# Patient Record
Sex: FEMALE | Race: WHITE | NOT HISPANIC OR LATINO | URBAN - METROPOLITAN AREA
[De-identification: names, ages, dates, MRNs, and addresses within clinical notes are randomized per-mention and may not be internally consistent; named-entity substitution may affect disease eponyms.]

---

## 2017-01-08 ENCOUNTER — EMERGENCY (EMERGENCY)
Facility: HOSPITAL | Age: 72
LOS: 1 days | Discharge: HOME | End: 2017-01-08
Attending: EMERGENCY MEDICINE | Admitting: EMERGENCY MEDICINE
Payer: MEDICARE

## 2017-01-08 VITALS
DIASTOLIC BLOOD PRESSURE: 78 MMHG | SYSTOLIC BLOOD PRESSURE: 148 MMHG | RESPIRATION RATE: 18 BRPM | WEIGHT: 134.04 LBS | OXYGEN SATURATION: 95 % | HEART RATE: 88 BPM | TEMPERATURE: 98 F

## 2017-01-08 VITALS
HEART RATE: 77 BPM | SYSTOLIC BLOOD PRESSURE: 118 MMHG | DIASTOLIC BLOOD PRESSURE: 72 MMHG | TEMPERATURE: 98 F | OXYGEN SATURATION: 98 % | RESPIRATION RATE: 18 BRPM

## 2017-01-08 DIAGNOSIS — R07.81 PLEURODYNIA: ICD-10-CM

## 2017-01-08 DIAGNOSIS — Z88.8 ALLERGY STATUS TO OTHER DRUGS, MEDICAMENTS AND BIOLOGICAL SUBSTANCES STATUS: ICD-10-CM

## 2017-01-08 LAB
ALBUMIN SERPL ELPH-MCNC: 3.5 G/DL — SIGNIFICANT CHANGE UP (ref 3.4–5)
ALP SERPL-CCNC: 122 U/L — HIGH (ref 40–120)
ALT FLD-CCNC: 53 U/L — HIGH (ref 12–42)
ANION GAP SERPL CALC-SCNC: 9 MMOL/L — SIGNIFICANT CHANGE UP (ref 9–16)
APTT BLD: 30.8 SEC — SIGNIFICANT CHANGE UP (ref 27.5–37.4)
AST SERPL-CCNC: 50 U/L — HIGH (ref 15–37)
BASOPHILS NFR BLD AUTO: 0.6 % — SIGNIFICANT CHANGE UP (ref 0–2)
BILIRUB SERPL-MCNC: 0.2 MG/DL — SIGNIFICANT CHANGE UP (ref 0.2–1.2)
BUN SERPL-MCNC: 25 MG/DL — HIGH (ref 7–23)
CALCIUM SERPL-MCNC: 8.5 MG/DL — SIGNIFICANT CHANGE UP (ref 8.5–10.5)
CHLORIDE SERPL-SCNC: 105 MMOL/L — SIGNIFICANT CHANGE UP (ref 96–108)
CK MB CFR SERPL CALC: 1.8 NG/ML — SIGNIFICANT CHANGE UP (ref 0.5–3.6)
CK SERPL-CCNC: 110 U/L — SIGNIFICANT CHANGE UP (ref 26–192)
CK SERPL-CCNC: 81 U/L — SIGNIFICANT CHANGE UP (ref 26–192)
CO2 SERPL-SCNC: 25 MMOL/L — SIGNIFICANT CHANGE UP (ref 22–31)
CREAT SERPL-MCNC: 0.79 MG/DL — SIGNIFICANT CHANGE UP (ref 0.5–1.3)
EOSINOPHIL NFR BLD AUTO: 1.9 % — SIGNIFICANT CHANGE UP (ref 0–6)
GLUCOSE SERPL-MCNC: 98 MG/DL — SIGNIFICANT CHANGE UP (ref 70–99)
HCT VFR BLD CALC: 38.5 % — SIGNIFICANT CHANGE UP (ref 34.5–45)
HGB BLD-MCNC: 12.8 G/DL — SIGNIFICANT CHANGE UP (ref 11.5–15.5)
INR BLD: 0.92 — SIGNIFICANT CHANGE UP (ref 0.88–1.16)
LYMPHOCYTES # BLD AUTO: 16.8 % — SIGNIFICANT CHANGE UP (ref 13–44)
MCHC RBC-ENTMCNC: 30.8 PG — SIGNIFICANT CHANGE UP (ref 27–34)
MCHC RBC-ENTMCNC: 33.2 G/DL — SIGNIFICANT CHANGE UP (ref 32–36)
MCV RBC AUTO: 92.5 FL — SIGNIFICANT CHANGE UP (ref 80–100)
MONOCYTES NFR BLD AUTO: 10.5 % — SIGNIFICANT CHANGE UP (ref 2–14)
NEUTROPHILS NFR BLD AUTO: 70.2 % — SIGNIFICANT CHANGE UP (ref 43–77)
PLATELET # BLD AUTO: 227 K/UL — SIGNIFICANT CHANGE UP (ref 150–400)
POTASSIUM SERPL-MCNC: 3.9 MMOL/L — SIGNIFICANT CHANGE UP (ref 3.5–5.3)
POTASSIUM SERPL-SCNC: 3.9 MMOL/L — SIGNIFICANT CHANGE UP (ref 3.5–5.3)
PROT SERPL-MCNC: 7 G/DL — SIGNIFICANT CHANGE UP (ref 6.4–8.2)
PROTHROM AB SERPL-ACNC: 10.2 SEC — SIGNIFICANT CHANGE UP (ref 10–13.1)
RBC # BLD: 4.16 M/UL — SIGNIFICANT CHANGE UP (ref 3.8–5.2)
RBC # FLD: 13 % — SIGNIFICANT CHANGE UP (ref 10.3–16.9)
SODIUM SERPL-SCNC: 139 MMOL/L — SIGNIFICANT CHANGE UP (ref 135–145)
TROPONIN I SERPL-MCNC: <0.015 NG/ML — SIGNIFICANT CHANGE UP (ref 0.01–0.04)
TROPONIN I SERPL-MCNC: <0.015 NG/ML — SIGNIFICANT CHANGE UP (ref 0.01–0.04)
WBC # BLD: 8.9 K/UL — SIGNIFICANT CHANGE UP (ref 3.8–10.5)
WBC # FLD AUTO: 8.9 K/UL — SIGNIFICANT CHANGE UP (ref 3.8–10.5)

## 2017-01-08 PROCEDURE — 85610 PROTHROMBIN TIME: CPT

## 2017-01-08 PROCEDURE — 71045 X-RAY EXAM CHEST 1 VIEW: CPT

## 2017-01-08 PROCEDURE — 82553 CREATINE MB FRACTION: CPT

## 2017-01-08 PROCEDURE — 84484 ASSAY OF TROPONIN QUANT: CPT

## 2017-01-08 PROCEDURE — 71010: CPT | Mod: 26

## 2017-01-08 PROCEDURE — 93005 ELECTROCARDIOGRAM TRACING: CPT

## 2017-01-08 PROCEDURE — 93010 ELECTROCARDIOGRAM REPORT: CPT

## 2017-01-08 PROCEDURE — 80053 COMPREHEN METABOLIC PANEL: CPT

## 2017-01-08 PROCEDURE — 85730 THROMBOPLASTIN TIME PARTIAL: CPT

## 2017-01-08 PROCEDURE — 85025 COMPLETE CBC W/AUTO DIFF WBC: CPT

## 2017-01-08 PROCEDURE — 85379 FIBRIN DEGRADATION QUANT: CPT

## 2017-01-08 PROCEDURE — 99285 EMERGENCY DEPT VISIT HI MDM: CPT | Mod: 25

## 2017-01-08 PROCEDURE — 36415 COLL VENOUS BLD VENIPUNCTURE: CPT

## 2017-01-08 PROCEDURE — 82550 ASSAY OF CK (CPK): CPT

## 2017-01-08 PROCEDURE — 99283 EMERGENCY DEPT VISIT LOW MDM: CPT | Mod: 25

## 2017-01-08 NOTE — ED PROVIDER NOTE - PROGRESS NOTE DETAILS
pt sleeping and comfortable during ED stay  neg trop x 2  case d/w dr tabor- stable for d/c  stress test in 1-2 days return precautions d/w pt including CP sob N/V dizziness - low susp for ACS at this time

## 2017-01-08 NOTE — ED PROVIDER NOTE - MEDICAL DECISION MAKING DETAILS
72 yo female with CP since 7 am today - non extertional -nl EKG -Fam hx of MI -Dad- hx of hiatal hernia - ex smoker  last stress test was nl 1 year ago await - not classic for ACS or PE -- await trop and d-dimer

## 2017-01-08 NOTE — ED PROVIDER NOTE - CARE PLAN
Principal Discharge DX:	Chest pain Principal Discharge DX:	Chest pain  Secondary Diagnosis:	Rib pain on left side

## 2017-01-08 NOTE — ED ADULT NURSE NOTE - CHPI ED SYMPTOMS NEG
no orthopnea/no loss of consciousness/no sleeping issues/no fatigue/no chills/no cough/no vomiting/no edema/no chest tightness/no crying/no back pain/no syncope/no weakness/no malaise/no jaw pain/no palpitations/no paroxysmal nocturnal dyspnea/no shortness of breath/no nervousness/no numbness/no dizziness/no fever/no diaphoresis/no left arm pain

## 2017-01-08 NOTE — CONSULT NOTE ADULT - SUBJECTIVE AND OBJECTIVE BOX
CHIEF COMPLAINT:  chest pain  HISTORY OF PRESENT ILLNESS:  The patient has been having left lateral crampy chest pain. It has lasted for hours and is a 3/10. It is constant. The pain is unrelated to breathing walking or eating. The patient reports a normal stress test a year ago. She has a family history of coronary disease in her parents when they were elderly. She is a former smoker. The patient was doing some unusual activity yesterday he required reaching up high. She has also had some nausea. Last night the patient had heavier than usual alcohol intake. The patient also reports an echocardiogram that was unremarkable 1.5 years ago.The patient is physically active without chest discomfort.  Chart reviewed  PAST MEDICAL & SURGICAL HISTORY:  No pertinent past medical history      [  ] Diabetes   [  ] Hypertension  [  ] Hyperlipidemia  [  ] CAD  [  ] PCI  [  ] CABG    PREVIOUS DIAGNOSTIC TESTING:    [ x ] Echocardiogram:  [  ]  Catheterization:  [ x ] Stress Test:  	    MEDICATIONS:Aspirin                  FAMILY HISTORY:  Coronary disease and advanced age in her parents.    SOCIAL HISTORY:    [ x ] Non-smoker  [  ] Smoker  [  ] Alcohol    Allergies    nitroglycerin (Unknown)    Intolerances    	    REVIEW OF SYSTEMS:  CONSTITUTIONAL: No fever, weight loss, or fatigue  EYES: No eye pain, visual disturbances, or discharge  ENMT:  No difficulty hearing, tinnitus, vertigo; No sinus or throat pain  NECK: No pain or stiffness  RESPIRATORY: No cough, wheezing, chills or hemoptysis; No Shortness of Breath  CARDIOVASCULAR: +chest pain,- palpitations, passing out, dizziness, or leg swelling  GASTROINTESTINAL: No abdominal or epigastric pain. No nausea, vomiting, or hematemesis; No diarrhea or constipation. No melena or hematochezia.  GENITOURINARY: No dysuria, frequency, hematuria, or incontinence  NEUROLOGICAL: No headaches, memory loss, loss of strength, numbness, or tremors  SKIN: No itching, burning, rashes, or lesions   LYMPH Nodes: No enlarged glands  ENDOCRINE: No heat or cold intolerance; No hair loss  MUSCULOSKELETAL: No joint pain or swelling; No muscle, back, or extremity pain  PSYCHIATRIC: No depression, anxiety, mood swings, or difficulty sleeping  HEME/LYMPH: No easy bruising, or bleeding gums  ALLERY AND IMMUNOLOGIC: No hives or eczema	    PHYSICAL EXAM:  T(C): 36.7, Max: 36.7 (01-08 @ 10:03)  HR: 77 (77 - 90)  BP: 118/72 (115/67 - 148/78)  RR: 18 (18 - 18)  SpO2: 98% (95% - 99%)  Wt(kg): --  I&O's Summary      Appearance: Normal	  HEENT:   Normal oral mucosa, PERRL, EOMI	  Lymphatic: No lymphadenopathy  Cardiovascular: Normal S1 S2, No JVD, No murmur, No edema. + Chest tenderness  Respiratory: Lungs clear to auscultation	  Psychiatry: A & O x 3, Mood & affect appropriate  Gastrointestinal:  Soft, Non-tender, + BS	  Skin: No rashes, No ecchymoses, No cyanosis	  Neurologic: Non-focal  Extremities: Normal range of motion, No clubbing or cyanosis  TELEMETRY: 	  Sinus rhythm  ECG:  	Normal sinus rhythm RSR prime in V1  RADIOLOGY:	Chest x-ray unremarkable  LABS:	 	    CARDIAC MARKERS:  Troponin I, Serum: <0.015 ng/mL (01-08 @ 10:36)                         12.8   8.9   )-----------( 227      ( 08 Jan 2017 10:36 )             38.5     08 Jan 2017 10:36    139    |  105    |  25     ----------------------------<  98     3.9     |  25     |  0.79     Ca    8.5        08 Jan 2017 10:36    TPro  7.0    /  Alb  3.5    /  TBili  0.2    /  DBili  x      /  AST  50     /  ALT  53     /  AlkPhos  122    08 Jan 2017 10:36  :   PT/INR - ( 08 Jan 2017 10:36 )   PT: 10.2 sec;   INR: 0.92          PTT - ( 08 Jan 2017 10:36 )  PTT:30.8 sec    ASSESSMENT/PLAN: 	  Chest pain-the pain is atypical/non-anginal. There is no history of exertional pain. The EKG is normal. The first troponin is normal. The chest x-ray is unremarkable.The chest is tender. We await repeat troponin. Continue aspirin.  Case discussed with Emergency room physician.

## 2017-01-08 NOTE — ED PROVIDER NOTE - OBJECTIVE STATEMENT
70 yo female with hx of hiatal hernia, rotator cuff tear - labral in the past  with onset of left lateral rib pain upon awakening at 7 am  -no N/V-  slight radiation to left arm  - no back pain- no diaphoresis no cough or fevers or chills- no  recent long car/plane trips Dad  fatal MI  age 59   ex smoker  quit 40 years ago  3 ppd  age 10-30  no DM or hx of HLD  last stress test was 1 year ago  -- treadmill  was reportedly neg

## 2017-01-08 NOTE — ED ADULT TRIAGE NOTE - CHIEF COMPLAINT QUOTE
patient states that at 7AM she felt pain in her breast and around the left side of her ribs and chest. she states that she  took an  baby aspirin at the time. denies fever, sob, dizziness. patient does not take daily medications and denies any medical problems

## 2017-03-17 ENCOUNTER — EMERGENCY (EMERGENCY)
Facility: HOSPITAL | Age: 72
LOS: 1 days | Discharge: HOME | End: 2017-03-17
Attending: EMERGENCY MEDICINE | Admitting: EMERGENCY MEDICINE
Payer: MEDICARE

## 2017-03-17 VITALS
RESPIRATION RATE: 18 BRPM | HEART RATE: 81 BPM | TEMPERATURE: 97 F | SYSTOLIC BLOOD PRESSURE: 124 MMHG | OXYGEN SATURATION: 100 % | DIASTOLIC BLOOD PRESSURE: 74 MMHG

## 2017-03-17 DIAGNOSIS — Z88.8 ALLERGY STATUS TO OTHER DRUGS, MEDICAMENTS AND BIOLOGICAL SUBSTANCES STATUS: ICD-10-CM

## 2017-03-17 DIAGNOSIS — H57.12 OCULAR PAIN, LEFT EYE: ICD-10-CM

## 2017-03-17 PROCEDURE — 99283 EMERGENCY DEPT VISIT LOW MDM: CPT

## 2017-03-17 RX ORDER — CIPROFLOXACIN HCL 0.3 %
1 DROPS OPHTHALMIC (EYE) ONCE
Qty: 0 | Refills: 0 | Status: COMPLETED | OUTPATIENT
Start: 2017-03-17 | End: 2017-03-17

## 2017-03-17 RX ADMIN — Medication 1 DROP(S): at 03:28

## 2017-03-17 NOTE — ED PROVIDER NOTE - OBJECTIVE STATEMENT
feels FB of her contact retained in eye as vision better OS> OD Patient has tried manually for hours to remove this FB and now eye irritated

## 2017-03-17 NOTE — ED PROVIDER NOTE - PHYSICAL EXAMINATION
OS with somewhat better vision then OD with various visual assessments for distance viewing though no FB/ contact appreciated

## 2017-03-17 NOTE — ED PROVIDER NOTE - MEDICAL DECISION MAKING DETAILS
counseled on ophthalmological follow up eye drops given suggestred decreased attempts as no providor ( PA and both Attendings fail to locate any contact in EYE )

## 2017-03-17 NOTE — ED PROVIDER NOTE - ATTENDING CONTRIBUTION TO CARE
71 yof pw FB sensation in L eye, feels like her contact is in as she sees better out of L eye (only wears L eye contact).  attempted removal at home w/o success.      agree w/ PA, mildly injected conjunctiva, full EOMI, no entrapment, no chemosis, no obvious FB noted, will give cipro ophthalmic (possible irritation from repeated removal at home), MEETH follow up

## 2018-05-02 ENCOUNTER — EMERGENCY (EMERGENCY)
Facility: HOSPITAL | Age: 73
LOS: 1 days | Discharge: HOME | End: 2018-05-02
Attending: EMERGENCY MEDICINE | Admitting: EMERGENCY MEDICINE
Payer: MEDICARE

## 2018-05-02 VITALS
RESPIRATION RATE: 16 BRPM | WEIGHT: 134.92 LBS | TEMPERATURE: 98 F | SYSTOLIC BLOOD PRESSURE: 128 MMHG | HEART RATE: 71 BPM | DIASTOLIC BLOOD PRESSURE: 76 MMHG | OXYGEN SATURATION: 98 %

## 2018-05-02 VITALS
SYSTOLIC BLOOD PRESSURE: 139 MMHG | OXYGEN SATURATION: 100 % | RESPIRATION RATE: 16 BRPM | DIASTOLIC BLOOD PRESSURE: 72 MMHG | HEART RATE: 68 BPM

## 2018-05-02 DIAGNOSIS — R07.9 CHEST PAIN, UNSPECIFIED: ICD-10-CM

## 2018-05-02 DIAGNOSIS — Z87.891 PERSONAL HISTORY OF NICOTINE DEPENDENCE: ICD-10-CM

## 2018-05-02 DIAGNOSIS — R06.02 SHORTNESS OF BREATH: ICD-10-CM

## 2018-05-02 DIAGNOSIS — Z88.8 ALLERGY STATUS TO OTHER DRUGS, MEDICAMENTS AND BIOLOGICAL SUBSTANCES STATUS: ICD-10-CM

## 2018-05-02 LAB
ANION GAP SERPL CALC-SCNC: 10 MMOL/L — SIGNIFICANT CHANGE UP (ref 5–17)
APTT BLD: 28.6 SEC — SIGNIFICANT CHANGE UP (ref 27.5–37.4)
BASOPHILS NFR BLD AUTO: 0.5 % — SIGNIFICANT CHANGE UP (ref 0–2)
BUN SERPL-MCNC: 16 MG/DL — SIGNIFICANT CHANGE UP (ref 7–23)
CALCIUM SERPL-MCNC: 9.5 MG/DL — SIGNIFICANT CHANGE UP (ref 8.4–10.5)
CHLORIDE SERPL-SCNC: 100 MMOL/L — SIGNIFICANT CHANGE UP (ref 96–108)
CK MB CFR SERPL CALC: 4.1 NG/ML — SIGNIFICANT CHANGE UP (ref 0–6.7)
CK SERPL-CCNC: 191 U/L — HIGH (ref 25–170)
CO2 SERPL-SCNC: 28 MMOL/L — SIGNIFICANT CHANGE UP (ref 22–31)
CREAT SERPL-MCNC: 0.78 MG/DL — SIGNIFICANT CHANGE UP (ref 0.5–1.3)
D DIMER BLD IA.RAPID-MCNC: <150 NG/ML DDU — SIGNIFICANT CHANGE UP
EOSINOPHIL NFR BLD AUTO: 1.1 % — SIGNIFICANT CHANGE UP (ref 0–6)
GLUCOSE SERPL-MCNC: 140 MG/DL — HIGH (ref 70–99)
HCT VFR BLD CALC: 39.2 % — SIGNIFICANT CHANGE UP (ref 34.5–45)
HGB BLD-MCNC: 12.8 G/DL — SIGNIFICANT CHANGE UP (ref 11.5–15.5)
INR BLD: 0.96 — SIGNIFICANT CHANGE UP (ref 0.88–1.16)
LYMPHOCYTES # BLD AUTO: 20 % — SIGNIFICANT CHANGE UP (ref 13–44)
MCHC RBC-ENTMCNC: 30 PG — SIGNIFICANT CHANGE UP (ref 27–34)
MCHC RBC-ENTMCNC: 32.7 G/DL — SIGNIFICANT CHANGE UP (ref 32–36)
MCV RBC AUTO: 92 FL — SIGNIFICANT CHANGE UP (ref 80–100)
MONOCYTES NFR BLD AUTO: 7.7 % — SIGNIFICANT CHANGE UP (ref 2–14)
NEUTROPHILS NFR BLD AUTO: 70.7 % — SIGNIFICANT CHANGE UP (ref 43–77)
PLATELET # BLD AUTO: 271 K/UL — SIGNIFICANT CHANGE UP (ref 150–400)
POTASSIUM SERPL-MCNC: 3.3 MMOL/L — LOW (ref 3.5–5.3)
POTASSIUM SERPL-SCNC: 3.3 MMOL/L — LOW (ref 3.5–5.3)
PROTHROM AB SERPL-ACNC: 10.6 SEC — SIGNIFICANT CHANGE UP (ref 9.8–12.7)
RBC # BLD: 4.26 M/UL — SIGNIFICANT CHANGE UP (ref 3.8–5.2)
RBC # FLD: 12.9 % — SIGNIFICANT CHANGE UP (ref 10.3–16.9)
SODIUM SERPL-SCNC: 138 MMOL/L — SIGNIFICANT CHANGE UP (ref 135–145)
TROPONIN T SERPL-MCNC: <0.01 NG/ML — SIGNIFICANT CHANGE UP (ref 0–0.01)
WBC # BLD: 9.6 K/UL — SIGNIFICANT CHANGE UP (ref 3.8–10.5)
WBC # FLD AUTO: 9.6 K/UL — SIGNIFICANT CHANGE UP (ref 3.8–10.5)

## 2018-05-02 PROCEDURE — 85379 FIBRIN DEGRADATION QUANT: CPT

## 2018-05-02 PROCEDURE — 36415 COLL VENOUS BLD VENIPUNCTURE: CPT

## 2018-05-02 PROCEDURE — 85610 PROTHROMBIN TIME: CPT

## 2018-05-02 PROCEDURE — 80048 BASIC METABOLIC PNL TOTAL CA: CPT

## 2018-05-02 PROCEDURE — 85025 COMPLETE CBC W/AUTO DIFF WBC: CPT

## 2018-05-02 PROCEDURE — 99285 EMERGENCY DEPT VISIT HI MDM: CPT | Mod: 25

## 2018-05-02 PROCEDURE — 93005 ELECTROCARDIOGRAM TRACING: CPT

## 2018-05-02 PROCEDURE — 82553 CREATINE MB FRACTION: CPT

## 2018-05-02 PROCEDURE — 82550 ASSAY OF CK (CPK): CPT

## 2018-05-02 PROCEDURE — 84484 ASSAY OF TROPONIN QUANT: CPT

## 2018-05-02 PROCEDURE — 93010 ELECTROCARDIOGRAM REPORT: CPT

## 2018-05-02 PROCEDURE — 71046 X-RAY EXAM CHEST 2 VIEWS: CPT

## 2018-05-02 PROCEDURE — 99284 EMERGENCY DEPT VISIT MOD MDM: CPT | Mod: 25

## 2018-05-02 PROCEDURE — 71046 X-RAY EXAM CHEST 2 VIEWS: CPT | Mod: 26

## 2018-05-02 PROCEDURE — 85730 THROMBOPLASTIN TIME PARTIAL: CPT

## 2018-05-02 RX ORDER — ASPIRIN/CALCIUM CARB/MAGNESIUM 324 MG
325 TABLET ORAL ONCE
Qty: 0 | Refills: 0 | Status: COMPLETED | OUTPATIENT
Start: 2018-05-02 | End: 2018-05-02

## 2018-05-02 RX ADMIN — Medication 325 MILLIGRAM(S): at 19:14

## 2018-05-02 NOTE — ED PROVIDER NOTE - PROGRESS NOTE DETAILS
Dr Crenshaw in ed and eval pt - agreed w plan for enzymes, ddimer.  If neg eval, pt to fu as outpt for stress and echo in the office. Repeat ekg similar to 1st.  EKG's reviewed w Dr Crenshaw - she feels ekg's ok and pt can fu as planned as outpt.

## 2018-05-02 NOTE — ED PROVIDER NOTE - MEDICAL DECISION MAKING DETAILS
Pt c/o cp/sob since moving furniture at 230p today w ?ble ekg changes but no stemi criteria.  Pt w/o h/o cad.  ? acs.  No pe risks, pna/uri sx - low suspicion for these.  Plan 1 set enzymes since pain > 4h, cxr, labs, reassess.  Pt given asa.

## 2018-05-02 NOTE — ED ADULT NURSE NOTE - CHIEF COMPLAINT QUOTE
73 y/o female c/o SOB, feeling nauseous and chest discomfort for the past few days. pt states " I feel like my heart is working more than usual", Pt denies cp, dizziness, vomiting

## 2018-05-02 NOTE — ED ADULT TRIAGE NOTE - CHIEF COMPLAINT QUOTE
71 y/o female c/o SOB, feeling nauseous and chest discomfort for the past few days. pt states " I feel like my heart is working more than usual", Pt denies cp, dizziness, vomiting

## 2018-05-02 NOTE — ED PROVIDER NOTE - OBJECTIVE STATEMENT
73 yo female no pmh c/o feeling her heart beat harder than typical w/o irregular heart beat sensation and mild sob since moving furniture today at 230.  No radiation, n, dizziness, diaphoresis.  Pt's partner noted recent pursed lip breathing w/ exertion w/o pt noting cp/wetzel.  No h/o cad.  Prior stress test ~ 2 yr ago nl.  No le pain/swelling, recent immobilization, h/o pe/dvt, uri sx, cough, fever.  + fh cad (father mi 60's).  No h/o similar sx.

## 2018-05-10 ENCOUNTER — OUTPATIENT (OUTPATIENT)
Dept: OUTPATIENT SERVICES | Facility: HOSPITAL | Age: 73
LOS: 1 days | End: 2018-05-10
Payer: MEDICARE

## 2018-05-10 DIAGNOSIS — R06.00 DYSPNEA, UNSPECIFIED: ICD-10-CM

## 2018-05-10 PROCEDURE — 93016 CV STRESS TEST SUPVJ ONLY: CPT

## 2018-05-10 PROCEDURE — 78452 HT MUSCLE IMAGE SPECT MULT: CPT | Mod: 26

## 2018-05-10 PROCEDURE — A9505: CPT

## 2018-05-10 PROCEDURE — 93306 TTE W/DOPPLER COMPLETE: CPT | Mod: 26

## 2018-05-10 PROCEDURE — 93306 TTE W/DOPPLER COMPLETE: CPT

## 2018-05-10 PROCEDURE — 78452 HT MUSCLE IMAGE SPECT MULT: CPT

## 2018-05-10 PROCEDURE — A9500: CPT

## 2018-05-10 PROCEDURE — 93018 CV STRESS TEST I&R ONLY: CPT

## 2018-05-10 PROCEDURE — 93017 CV STRESS TEST TRACING ONLY: CPT

## 2018-06-21 ENCOUNTER — EMERGENCY (EMERGENCY)
Facility: HOSPITAL | Age: 73
LOS: 1 days | Discharge: HOME | End: 2018-06-21
Attending: EMERGENCY MEDICINE | Admitting: EMERGENCY MEDICINE
Payer: MEDICARE

## 2018-06-21 VITALS
DIASTOLIC BLOOD PRESSURE: 60 MMHG | HEART RATE: 98 BPM | OXYGEN SATURATION: 95 % | RESPIRATION RATE: 18 BRPM | SYSTOLIC BLOOD PRESSURE: 106 MMHG | TEMPERATURE: 99 F

## 2018-06-21 VITALS
HEART RATE: 104 BPM | SYSTOLIC BLOOD PRESSURE: 149 MMHG | OXYGEN SATURATION: 97 % | WEIGHT: 126.1 LBS | RESPIRATION RATE: 18 BRPM | DIASTOLIC BLOOD PRESSURE: 75 MMHG | TEMPERATURE: 103 F

## 2018-06-21 LAB
ALBUMIN SERPL ELPH-MCNC: 4.2 G/DL — SIGNIFICANT CHANGE UP (ref 3.3–5)
ALP SERPL-CCNC: 175 U/L — HIGH (ref 40–120)
ALT FLD-CCNC: 59 U/L — HIGH (ref 10–45)
ANION GAP SERPL CALC-SCNC: 16 MMOL/L — SIGNIFICANT CHANGE UP (ref 5–17)
APPEARANCE UR: CLEAR — SIGNIFICANT CHANGE UP
APTT BLD: 30.2 SEC — SIGNIFICANT CHANGE UP (ref 27.5–37.4)
AST SERPL-CCNC: 54 U/L — HIGH (ref 10–40)
BASOPHILS NFR BLD AUTO: 0.2 % — SIGNIFICANT CHANGE UP (ref 0–2)
BILIRUB SERPL-MCNC: 1.2 MG/DL — SIGNIFICANT CHANGE UP (ref 0.2–1.2)
BILIRUB UR-MCNC: ABNORMAL
BUN SERPL-MCNC: 10 MG/DL — SIGNIFICANT CHANGE UP (ref 7–23)
CALCIUM SERPL-MCNC: 9.4 MG/DL — SIGNIFICANT CHANGE UP (ref 8.4–10.5)
CHLORIDE SERPL-SCNC: 96 MMOL/L — SIGNIFICANT CHANGE UP (ref 96–108)
CO2 SERPL-SCNC: 26 MMOL/L — SIGNIFICANT CHANGE UP (ref 22–31)
COLOR SPEC: YELLOW — SIGNIFICANT CHANGE UP
CREAT SERPL-MCNC: 0.74 MG/DL — SIGNIFICANT CHANGE UP (ref 0.5–1.3)
DIFF PNL FLD: ABNORMAL
EOSINOPHIL NFR BLD AUTO: 0.1 % — SIGNIFICANT CHANGE UP (ref 0–6)
GLUCOSE SERPL-MCNC: 134 MG/DL — HIGH (ref 70–99)
GLUCOSE UR QL: NEGATIVE — SIGNIFICANT CHANGE UP
HCT VFR BLD CALC: 40.1 % — SIGNIFICANT CHANGE UP (ref 34.5–45)
HGB BLD-MCNC: 12.9 G/DL — SIGNIFICANT CHANGE UP (ref 11.5–15.5)
INR BLD: 1.14 — SIGNIFICANT CHANGE UP (ref 0.88–1.16)
KETONES UR-MCNC: 15 MG/DL
LACTATE SERPL-SCNC: 1.5 MMOL/L — SIGNIFICANT CHANGE UP (ref 0.5–2)
LEUKOCYTE ESTERASE UR-ACNC: ABNORMAL
LIDOCAIN IGE QN: 18 U/L — SIGNIFICANT CHANGE UP (ref 7–60)
LYMPHOCYTES # BLD AUTO: 4.3 % — LOW (ref 13–44)
MCHC RBC-ENTMCNC: 29.6 PG — SIGNIFICANT CHANGE UP (ref 27–34)
MCHC RBC-ENTMCNC: 32.2 G/DL — SIGNIFICANT CHANGE UP (ref 32–36)
MCV RBC AUTO: 92 FL — SIGNIFICANT CHANGE UP (ref 80–100)
MONOCYTES NFR BLD AUTO: 10.8 % — SIGNIFICANT CHANGE UP (ref 2–14)
NEUTROPHILS NFR BLD AUTO: 84.6 % — HIGH (ref 43–77)
NITRITE UR-MCNC: NEGATIVE — SIGNIFICANT CHANGE UP
PH UR: 5.5 — SIGNIFICANT CHANGE UP (ref 5–8)
PLATELET # BLD AUTO: 264 K/UL — SIGNIFICANT CHANGE UP (ref 150–400)
POTASSIUM SERPL-MCNC: 3.6 MMOL/L — SIGNIFICANT CHANGE UP (ref 3.5–5.3)
POTASSIUM SERPL-SCNC: 3.6 MMOL/L — SIGNIFICANT CHANGE UP (ref 3.5–5.3)
PROT SERPL-MCNC: 8.1 G/DL — SIGNIFICANT CHANGE UP (ref 6–8.3)
PROT UR-MCNC: 30 MG/DL
PROTHROM AB SERPL-ACNC: 12.7 SEC — SIGNIFICANT CHANGE UP (ref 9.8–12.7)
RAPID RVP RESULT: SIGNIFICANT CHANGE UP
RBC # BLD: 4.36 M/UL — SIGNIFICANT CHANGE UP (ref 3.8–5.2)
RBC # FLD: 12.6 % — SIGNIFICANT CHANGE UP (ref 10.3–16.9)
SODIUM SERPL-SCNC: 138 MMOL/L — SIGNIFICANT CHANGE UP (ref 135–145)
SP GR SPEC: 1.02 — SIGNIFICANT CHANGE UP (ref 1–1.03)
UROBILINOGEN FLD QL: 1 E.U./DL — SIGNIFICANT CHANGE UP
WBC # BLD: 15.5 K/UL — HIGH (ref 3.8–10.5)
WBC # FLD AUTO: 15.5 K/UL — HIGH (ref 3.8–10.5)

## 2018-06-21 PROCEDURE — 84484 ASSAY OF TROPONIN QUANT: CPT

## 2018-06-21 PROCEDURE — 36415 COLL VENOUS BLD VENIPUNCTURE: CPT

## 2018-06-21 PROCEDURE — 82553 CREATINE MB FRACTION: CPT

## 2018-06-21 PROCEDURE — 99284 EMERGENCY DEPT VISIT MOD MDM: CPT | Mod: 25

## 2018-06-21 PROCEDURE — 85730 THROMBOPLASTIN TIME PARTIAL: CPT

## 2018-06-21 PROCEDURE — 96375 TX/PRO/DX INJ NEW DRUG ADDON: CPT

## 2018-06-21 PROCEDURE — 87040 BLOOD CULTURE FOR BACTERIA: CPT

## 2018-06-21 PROCEDURE — 87633 RESP VIRUS 12-25 TARGETS: CPT

## 2018-06-21 PROCEDURE — 94640 AIRWAY INHALATION TREATMENT: CPT

## 2018-06-21 PROCEDURE — 83880 ASSAY OF NATRIURETIC PEPTIDE: CPT

## 2018-06-21 PROCEDURE — 93005 ELECTROCARDIOGRAM TRACING: CPT

## 2018-06-21 PROCEDURE — 83690 ASSAY OF LIPASE: CPT

## 2018-06-21 PROCEDURE — 93010 ELECTROCARDIOGRAM REPORT: CPT

## 2018-06-21 PROCEDURE — 87486 CHLMYD PNEUM DNA AMP PROBE: CPT

## 2018-06-21 PROCEDURE — 87798 DETECT AGENT NOS DNA AMP: CPT

## 2018-06-21 PROCEDURE — 87086 URINE CULTURE/COLONY COUNT: CPT

## 2018-06-21 PROCEDURE — 80053 COMPREHEN METABOLIC PANEL: CPT

## 2018-06-21 PROCEDURE — 96374 THER/PROPH/DIAG INJ IV PUSH: CPT

## 2018-06-21 PROCEDURE — 85025 COMPLETE CBC W/AUTO DIFF WBC: CPT

## 2018-06-21 PROCEDURE — 71045 X-RAY EXAM CHEST 1 VIEW: CPT

## 2018-06-21 PROCEDURE — 82550 ASSAY OF CK (CPK): CPT

## 2018-06-21 PROCEDURE — 81001 URINALYSIS AUTO W/SCOPE: CPT

## 2018-06-21 PROCEDURE — 71045 X-RAY EXAM CHEST 1 VIEW: CPT | Mod: 26

## 2018-06-21 PROCEDURE — 83605 ASSAY OF LACTIC ACID: CPT

## 2018-06-21 PROCEDURE — 85610 PROTHROMBIN TIME: CPT

## 2018-06-21 PROCEDURE — 87581 M.PNEUMON DNA AMP PROBE: CPT

## 2018-06-21 RX ORDER — IPRATROPIUM/ALBUTEROL SULFATE 18-103MCG
3 AEROSOL WITH ADAPTER (GRAM) INHALATION ONCE
Qty: 0 | Refills: 0 | Status: COMPLETED | OUTPATIENT
Start: 2018-06-21 | End: 2018-06-21

## 2018-06-21 RX ORDER — KETOROLAC TROMETHAMINE 30 MG/ML
30 SYRINGE (ML) INJECTION ONCE
Qty: 0 | Refills: 0 | Status: DISCONTINUED | OUTPATIENT
Start: 2018-06-21 | End: 2018-06-21

## 2018-06-21 RX ORDER — SODIUM CHLORIDE 9 MG/ML
2000 INJECTION INTRAMUSCULAR; INTRAVENOUS; SUBCUTANEOUS ONCE
Qty: 0 | Refills: 0 | Status: COMPLETED | OUTPATIENT
Start: 2018-06-21 | End: 2018-06-21

## 2018-06-21 RX ORDER — CEFTRIAXONE 500 MG/1
1 INJECTION, POWDER, FOR SOLUTION INTRAMUSCULAR; INTRAVENOUS ONCE
Qty: 0 | Refills: 0 | Status: COMPLETED | OUTPATIENT
Start: 2018-06-21 | End: 2018-06-21

## 2018-06-21 RX ORDER — ACETAMINOPHEN 500 MG
975 TABLET ORAL ONCE
Qty: 0 | Refills: 0 | Status: COMPLETED | OUTPATIENT
Start: 2018-06-21 | End: 2018-06-21

## 2018-06-21 RX ADMIN — SODIUM CHLORIDE 4000 MILLILITER(S): 9 INJECTION INTRAMUSCULAR; INTRAVENOUS; SUBCUTANEOUS at 16:41

## 2018-06-21 RX ADMIN — Medication 975 MILLIGRAM(S): at 16:40

## 2018-06-21 RX ADMIN — CEFTRIAXONE 100 GRAM(S): 500 INJECTION, POWDER, FOR SOLUTION INTRAMUSCULAR; INTRAVENOUS at 16:40

## 2018-06-21 RX ADMIN — Medication 30 MILLIGRAM(S): at 18:39

## 2018-06-21 RX ADMIN — Medication 3 MILLILITER(S): at 18:42

## 2018-06-21 NOTE — ED PROVIDER NOTE - MEDICAL DECISION MAKING DETAILS
72F with no sig PMHx fever cough and malaise x 4 days. Pt febrile and tachy on arrival so worked up per sepsis protocol. wt based IVfs and IV ceftriaxone given (no azithro given hx of biaxin allergy). Dispo pending work up. Will d/w Dr. Crenshaw as well

## 2018-06-21 NOTE — ED PROVIDER NOTE - OBJECTIVE STATEMENT
72F with h/o MVP who p/w chills, productive cough, nausea, mild headache, and general malaise x 4 days, seen by her PMD Dr. Crenshaw yesterday and told she likely had a viral syndrome, but sx worse today and +fever so came to the ER. No CP/SOb, no dizziness or syncope, no leg swelling. Takes no meds.

## 2018-06-21 NOTE — ED ADULT TRIAGE NOTE - CHIEF COMPLAINT QUOTE
HA for 4 days - sometimes nausea- went to MD yesterday and told "viral" illness-- has had a cold and mild cough Over 2 weeks

## 2018-06-21 NOTE — ED ADULT NURSE REASSESSMENT NOTE - NS ED NURSE REASSESS COMMENT FT1
chest xray being performed at the bedside as per order. pt in NAD; pt and family updated on plan of care. safety and fall precautions maintained. family at the bedside. call bell within reach.

## 2018-06-21 NOTE — ED PROVIDER NOTE - PHYSICAL EXAMINATION
GEN: Well appearing, well nourished, awake, alert, oriented to person, place, time/situation and in no apparent distress.  ENT: Airway patent, Nasal mucosa clear. Mouth with normal mucosa. No pharyngeal swelling or erythema, no sinus TTP, no facial erythema.   EYES: Clear bilaterally. perrl, eomi  RESPIRATORY: Breathing comfortably with normal RR. No w/c/r.   CARDIAC: Regular rate and rhythm, borderline tachycardia, no m/r/g  ABDOMEN: Soft, nontender, +bowel sounds, no rebound, rigidity, or guarding.  MSK: Range of motion is not limited, no deformities noted. No meningismus.  NEURO: Alert and oriented x 3. Cn 2-12 intact. Strength 5/5 and sensation intact in all 4 extremities. Gait normal.   SKIN: Skin normal color for race, warm, dry and intact. No evidence of rash.  PSYCH: Alert and oriented to person, place, time/situation. normal mood and affect. no apparent risk to self or others. GEN: Well appearing, well nourished, awake, alert, oriented to person, place, time/situation and in no apparent distress.  ENT: Airway patent, Nasal mucosa clear. Mouth with normal mucosa. No pharyngeal swelling or erythema, no sinus TTP, no facial erythema. No meningismus.  EYES: Clear bilaterally. perrl, eomi  RESPIRATORY: Breathing comfortably with normal RR. No w/c/r.   CARDIAC: Regular rate and rhythm, borderline tachycardia, no m/r/g  ABDOMEN: Soft, nontender, +bowel sounds, no rebound, rigidity, or guarding.  MSK: Range of motion is not limited, no deformities noted. No meningismus.  NEURO: Alert and oriented x 3. Cn 2-12 intact. Strength 5/5 and sensation intact in all 4 extremities. Gait normal.   SKIN: Skin normal color for race, warm, dry and intact. No evidence of rash.  PSYCH: Alert and oriented to person, place, time/situation. normal mood and affect. no apparent risk to self or others.

## 2018-06-21 NOTE — ED ADULT NURSE NOTE - OBJECTIVE STATEMENT
c/o body aches, feeling "dehydrated" and fever.  Pt reports productive cough; does not know what color the sputum is; denies chest pain, SOB and pain. unlabored and even respirations.

## 2018-06-21 NOTE — ED PROVIDER NOTE - PROGRESS NOTE DETAILS
D/w Dr. Crenshaw. CXR with RLL PNA. I also d/w pt the possibility of a UTi as UA with +LE. A UCx was sent. Pt feels better and will be DC'd with Rx for levaquin.   The patient is stable for DC. They were advised to call their PMD for prompt outpatient follow up. Return precautions were discussed. The patient was advised to return to the ER for any concerning or worsening symptoms.

## 2018-06-22 LAB
CULTURE RESULTS: NO GROWTH — SIGNIFICANT CHANGE UP
SPECIMEN SOURCE: SIGNIFICANT CHANGE UP

## 2018-06-25 DIAGNOSIS — Z88.8 ALLERGY STATUS TO OTHER DRUGS, MEDICAMENTS AND BIOLOGICAL SUBSTANCES: ICD-10-CM

## 2018-06-25 DIAGNOSIS — R50.9 FEVER, UNSPECIFIED: ICD-10-CM

## 2018-06-25 DIAGNOSIS — Z88.1 ALLERGY STATUS TO OTHER ANTIBIOTIC AGENTS STATUS: ICD-10-CM

## 2018-06-25 DIAGNOSIS — J18.9 PNEUMONIA, UNSPECIFIED ORGANISM: ICD-10-CM

## 2018-06-26 LAB
CULTURE RESULTS: SIGNIFICANT CHANGE UP
CULTURE RESULTS: SIGNIFICANT CHANGE UP
SPECIMEN SOURCE: SIGNIFICANT CHANGE UP
SPECIMEN SOURCE: SIGNIFICANT CHANGE UP

## 2018-07-07 NOTE — ED ADULT TRIAGE NOTE - NS ED NURSE BANDS TYPE
Name band;
radiology results/need for outpatient follow-up/lab results/return to ED if symptoms worsen, persist or questions arise

## 2018-11-21 NOTE — ED PROVIDER NOTE - NS ED MD DISPO DISCHARGE CCDA
Unsuccessful NG insertion in either nares. Patient/Caregiver provided printed discharge information.

## 2018-12-09 NOTE — ED ADULT NURSE NOTE - OBJECTIVE STATEMENT
Nursing Note by Penny Spence CMA at 03/15/17 03:45 PM     Author:  Penny Spence CMA Service:  (none) Author Type:  Registered Nurse     Filed:  03/15/17 03:47 PM Encounter Date:  3/15/2017 Status:  Signed     :  Penny Spence CMA (Registered Nurse)            Last treatment reviewed with patient.   Side effects ?[SC1.1T] no[SC1.1M]   Side effects include[SC1.1T] none[SC1.1M].   Patient is feeling well today ?[SC1.1T] yes[SC1.1M]  Photosensitive medication list reviewed with the patient. Has the patient started on any photosensitive medication since last treatment ?[SC1.1T] no[SC1.1M]  If yes, what is the medication?[SC1.1T] none[SC1.1M]    Treatment administered per protocol and patient tolerated the treatment without incident.  Electronically Signed by:    Penny Spence CMA , 3/15/2017[SC1.1T]        Revision History        User Key Date/Time User Provider Type Action    > SC1.1 03/15/17 03:47 PM Penny Spence CMA Registered Nurse Sign    M - Manual, T - Template             Pt presents to ER with 3/10 left sided breast pain radiating around the left side of her chest that began this morning approx. 7am with nausea. Pt stated she ate Chinese food last night for the first time in approx. 25 years. Pt stated she took an  baby aspirin. Pt denies SOB, dizziness, abd pain, diarrhea, HA, fever, chills, visual changes, no other complaints. Lung sounds clear to auscultation bilat. Last BM was 3x this morning described as normal - Pt stated she does not have an ileocecal valve. Denies urinary S/S. Pt placed on cardiac monitor, will maintain safety. Pt presents to ER with 3/10 left sided breast pain radiating around the left side of her chest that began this morning approx. 7am with nausea. Pt stated she ate Chinese food last night for the first time in approx. 25 years. Pt stated she took an  baby aspirin. Pt denies SOB, dizziness, abd pain, diarrhea, HA, fever, chills, cold, cough, visual changes, no other complaints. Lung sounds clear to auscultation bilat. Last BM was 3x this morning described as normal - Pt stated she does not have an ileocecal valve. Denies urinary S/S. Pt reports sleeping on her left side last night and has a tear in her left labrum. Pt placed on cardiac monitor, will maintain safety.

## 2019-04-20 ENCOUNTER — EMERGENCY (EMERGENCY)
Facility: HOSPITAL | Age: 74
LOS: 1 days | Discharge: HOME | End: 2019-04-20
Attending: EMERGENCY MEDICINE | Admitting: EMERGENCY MEDICINE
Payer: MEDICARE

## 2019-04-20 VITALS
HEART RATE: 72 BPM | DIASTOLIC BLOOD PRESSURE: 69 MMHG | SYSTOLIC BLOOD PRESSURE: 108 MMHG | TEMPERATURE: 98 F | OXYGEN SATURATION: 97 % | RESPIRATION RATE: 16 BRPM

## 2019-04-20 VITALS
SYSTOLIC BLOOD PRESSURE: 114 MMHG | DIASTOLIC BLOOD PRESSURE: 73 MMHG | HEIGHT: 66 IN | WEIGHT: 125 LBS | RESPIRATION RATE: 17 BRPM | HEART RATE: 91 BPM | OXYGEN SATURATION: 96 % | TEMPERATURE: 99 F

## 2019-04-20 PROCEDURE — 85025 COMPLETE CBC W/AUTO DIFF WBC: CPT

## 2019-04-20 PROCEDURE — 72125 CT NECK SPINE W/O DYE: CPT | Mod: 26

## 2019-04-20 PROCEDURE — 70450 CT HEAD/BRAIN W/O DYE: CPT

## 2019-04-20 PROCEDURE — 36415 COLL VENOUS BLD VENIPUNCTURE: CPT

## 2019-04-20 PROCEDURE — 90715 TDAP VACCINE 7 YRS/> IM: CPT

## 2019-04-20 PROCEDURE — 99284 EMERGENCY DEPT VISIT MOD MDM: CPT | Mod: 25

## 2019-04-20 PROCEDURE — 72125 CT NECK SPINE W/O DYE: CPT

## 2019-04-20 PROCEDURE — 82553 CREATINE MB FRACTION: CPT

## 2019-04-20 PROCEDURE — 85730 THROMBOPLASTIN TIME PARTIAL: CPT

## 2019-04-20 PROCEDURE — 85610 PROTHROMBIN TIME: CPT

## 2019-04-20 PROCEDURE — 90471 IMMUNIZATION ADMIN: CPT

## 2019-04-20 PROCEDURE — 82550 ASSAY OF CK (CPK): CPT

## 2019-04-20 PROCEDURE — 80053 COMPREHEN METABOLIC PANEL: CPT

## 2019-04-20 PROCEDURE — 84484 ASSAY OF TROPONIN QUANT: CPT

## 2019-04-20 PROCEDURE — 12013 RPR F/E/E/N/L/M 2.6-5.0 CM: CPT

## 2019-04-20 PROCEDURE — 70450 CT HEAD/BRAIN W/O DYE: CPT | Mod: 26

## 2019-04-20 RX ORDER — ACETAMINOPHEN 500 MG
650 TABLET ORAL ONCE
Qty: 0 | Refills: 0 | Status: COMPLETED | OUTPATIENT
Start: 2019-04-20 | End: 2019-04-20

## 2019-04-20 RX ORDER — SODIUM CHLORIDE 9 MG/ML
1000 INJECTION INTRAMUSCULAR; INTRAVENOUS; SUBCUTANEOUS ONCE
Qty: 0 | Refills: 0 | Status: COMPLETED | OUTPATIENT
Start: 2019-04-20 | End: 2019-04-20

## 2019-04-20 RX ORDER — LIDOCAINE HYDROCHLORIDE AND EPINEPHRINE 10; 10 MG/ML; UG/ML
10 INJECTION, SOLUTION INFILTRATION; PERINEURAL ONCE
Qty: 0 | Refills: 0 | Status: COMPLETED | OUTPATIENT
Start: 2019-04-20 | End: 2019-04-20

## 2019-04-20 RX ORDER — BACITRACIN ZINC 500 UNIT/G
1 OINTMENT IN PACKET (EA) TOPICAL ONCE
Qty: 0 | Refills: 0 | Status: COMPLETED | OUTPATIENT
Start: 2019-04-20 | End: 2019-04-20

## 2019-04-20 RX ORDER — TETANUS TOXOID, REDUCED DIPHTHERIA TOXOID AND ACELLULAR PERTUSSIS VACCINE, ADSORBED 5; 2.5; 8; 8; 2.5 [IU]/.5ML; [IU]/.5ML; UG/.5ML; UG/.5ML; UG/.5ML
0.5 SUSPENSION INTRAMUSCULAR ONCE
Qty: 0 | Refills: 0 | Status: COMPLETED | OUTPATIENT
Start: 2019-04-20 | End: 2019-04-20

## 2019-04-20 RX ADMIN — Medication 650 MILLIGRAM(S): at 18:20

## 2019-04-20 RX ADMIN — TETANUS TOXOID, REDUCED DIPHTHERIA TOXOID AND ACELLULAR PERTUSSIS VACCINE, ADSORBED 0.5 MILLILITER(S): 5; 2.5; 8; 8; 2.5 SUSPENSION INTRAMUSCULAR at 19:38

## 2019-04-20 RX ADMIN — SODIUM CHLORIDE 2000 MILLILITER(S): 9 INJECTION INTRAMUSCULAR; INTRAVENOUS; SUBCUTANEOUS at 17:51

## 2019-04-20 RX ADMIN — LIDOCAINE HYDROCHLORIDE AND EPINEPHRINE 10 MILLILITER(S): 10; 10 INJECTION, SOLUTION INFILTRATION; PERINEURAL at 19:50

## 2019-04-20 RX ADMIN — Medication 1 APPLICATION(S): at 20:54

## 2019-04-20 RX ADMIN — Medication 650 MILLIGRAM(S): at 17:51

## 2019-04-20 NOTE — ED ADULT TRIAGE NOTE - CHIEF COMPLAINT QUOTE
"I just had a stomach virus and I was in the bathroom and I found myself on the floor in a pool of blood".

## 2019-04-20 NOTE — ED ADULT NURSE NOTE - NSSUHOSCREENINGYN_ED_ALL_ED
Patient is a 89y old  Female who presents with a chief complaint of UTI and face swelling (01 Dec 2017 07:19)      SUBJECTIVE / OVERNIGHT EVENTS: No overnight events. Pt non-verbal unable to obtain ROS  Daughter and aide at bedside- mental status at baseline. pt nonverbal with eyes closed mostly and that is her baseline A&Ox0. Family continues to perform cheek massage. Family report no BM for several days. Family anxious re: speech and swallow eval because they would like to feed patient, CN.    MEDICATIONS  (STANDING): reviewed  dextrose 5%. 1000 milliLiter(s) (100 mL/Hr) IV Continuous <Continuous>  dextrose 5%. 1000 milliLiter(s) (50 mL/Hr) IV Continuous <Continuous>  dextrose 50% Injectable 12.5 Gram(s) IV Push once  dextrose 50% Injectable 12.5 Gram(s) IV Push once  dextrose 50% Injectable 25 Gram(s) IV Push once  dextrose 50% Injectable 25 Gram(s) IV Push once  docusate sodium 100 milliGRAM(s) Oral three times a day  heparin  Injectable 5000 Unit(s) SubCutaneous every 8 hours  influenza   Vaccine 0.5 milliLiter(s) IntraMuscular once  insulin glargine Injectable (LANTUS) 5 Unit(s) SubCutaneous at bedtime  insulin lispro (HumaLOG) corrective regimen sliding scale   SubCutaneous three times a day before meals  levothyroxine Injectable 37 MICROGram(s) IV Push daily  senna 2 Tablet(s) Oral at bedtime  sodium bicarbonate 650 milliGRAM(s) Oral two times a day  vancomycin  IVPB 750 milliGRAM(s) IV Intermittent every 48 hours    MEDICATIONS  (PRN):  acetaminophen   Tablet. 325 milliGRAM(s) Oral every 6 hours PRN Mild Pain (1 - 3)  acetaminophen   Tablet. 650 milliGRAM(s) Oral every 6 hours PRN Moderate Pain (4 - 6)  bisacodyl Suppository 10 milliGRAM(s) Rectal daily PRN Constipation  dextrose Gel 1 Dose(s) Oral once PRN Blood Glucose LESS THAN 70 milliGRAM(s)/deciliter  glucagon  Injectable 1 milliGRAM(s) IntraMuscular once PRN Glucose LESS THAN 70 milligrams/deciliter  levalbuterol Inhalation 1.25 milliGRAM(s) Inhalation two times a day PRN shortness of breath and/or wheezing  oxyCODONE    5 mG/acetaminophen 325 mG 1 Tablet(s) Oral every 6 hours PRN Severe Pain (7 - 10)  polyethylene glycol 3350 17 Gram(s) Oral two times a day PRN Constipation        CAPILLARY BLOOD GLUCOSE      POCT Blood Glucose.: 240 mg/dL (04 Dec 2017 12:29)  POCT Blood Glucose.: 248 mg/dL (03 Dec 2017 22:09)  POCT Blood Glucose.: 293 mg/dL (03 Dec 2017 17:00)    I&O's Summary    Vital Signs Last 24 Hrs  T(C): 37 (04 Dec 2017 10:05), Max: 37.1 (03 Dec 2017 22:17)  T(F): 98.6 (04 Dec 2017 10:05), Max: 98.8 (03 Dec 2017 22:17)  HR: 84 (04 Dec 2017 10:05) (84 - 97)  BP: 136/55 (04 Dec 2017 10:05) (123/68 - 142/67)  BP(mean): --  RR: 17 (04 Dec 2017 10:05) (17 - 18)  SpO2: 96% (04 Dec 2017 10:05) (96% - 100%)    PHYSICAL EXAM:  GENERAL: Lying flat in bed, Eyes closed, No acute distress  HEENT: eyes remained closed, L parotid swelling noted  NECK: Supple  CHEST/LUNG: Clear to auscultation bilaterally  HEART: S1/S2, Regular rate and rhythm; late peaking 2/6 SABINE loudest in RUSB radiating to carotids  ABDOMEN: Mildly distended, NABS, soft,  nontender  EXTREMITIES:  contracted, 2+ Peripheral Pulses, no clubbing, cyanosis, or edema  PSYCH: AAOx0, non-verbal normal affect  SKIN assessed- sacral decubitus ulcer,    LABS: reviewed                        8.7    13.96 )-----------( 172      ( 04 Dec 2017 06:45 )             27.0     12-04    166<HH>  |  135<H>  |  96<H>  ----------------------------<  228<H>  3.8   |  16<L>  |  2.12<H>    Ca    9.1      04 Dec 2017 06:45  Phos  4.6     12-04  Mg     2.4     12-04    Microbiology  Specimen Source: BLOOD (12.02.17 @ 01:09)      Culture - Blood:   NO ORGANISMS ISOLATED  NO ORGANISMS ISOLATED AT 48 HRS. (12.01.17 @ 20:29)    Culture - Urine (11.29.17 @ 10:52)    Culture - Urine:   Culture grew 3 or more types of organisms which indicate  collection contamination; consider recollection only if  clinically indicated.    Specimen Source: URINE CATHETER    Gram Stain Blood:   ***** CRITICAL RESULT *****  PERSON CALLED / READ-BACK: WINSOME NOGUEIRA  DATE / TIME CALLED: 11/30/17 0704  CALLED BY: MOHAN JARRELL  GPCCL^Gram Pos Cocci In Clusters  AFTER: 37 HOURS INCUBATION  BOTTLE: AEROBIC   ANAEROBIC BOTTLES (11.28.17 @ 16:37)    RADIOLOGY & ADDITIONAL TESTS:  Imaging Personally Reviewed:    Consultant(s) Notes Reviewed:  ID, ENT    Care Discussed with Consultants/Other Providers: Yes - the patient is able to be screened

## 2019-04-20 NOTE — ED PROVIDER NOTE - NSFOLLOWUPINSTRUCTIONS_ED_ALL_ED_FT
Immediately return to the Emergency Department or call 911 for any high fever, trouble breathing, severe vomiting, worsening pain, or any other concerns.    You had 7 sutures placed today, they are non-absorbable prolene which will be required to be removed in approximately 7 days. You may return to the Emergency department, or any urgent care, or your primary care physician's office for removal of these.   Please be mindful of sun exposure after your wound as healed as this can lead to increased prominence and pigmentation of your scar.     Laceration     A laceration is a cut that goes through all of the layers of the skin and into the tissue that is right under the skin. Some lacerations heal on their own. Others need to be closed with skin adhesive strips, skin glue, stitches (sutures), or staples. Proper laceration care minimizes the risk of infection and helps the laceration to heal better.  If non-absorbable stitches or staples have been placed, they must be taken out within the time frame instructed by your healthcare provider.    SEEK IMMEDIATE MEDICAL CARE IF YOU HAVE ANY OF THE FOLLOWING SYMPTOMS: swelling around the wound, worsening pain, drainage from the wound, red streaking going away from your wound, inability to move finger or toe near the laceration, or discoloration of skin near the laceration.     Syncope    Syncope is when you temporarily lose consciousness, also called fainting or passing out. It is caused by a sudden decrease in blood flow to the brain. Even though most causes of syncope are not dangerous, syncope can possibly be a sign of a serious medical problem. Signs that you may be about to faint include feeling dizzy, lightheaded, nausea, visual changes, or cold/clammy skin. Do not drive, operate heavy machinery, or play sports until your health care provider says it is okay.    SEEK IMMEDIATE MEDICAL CARE IF YOU HAVE ANY OF THE FOLLOWING SYMPTOMS: severe headache, pain in your chest/abdomen/back, bleeding from your mouth or rectum, palpitations, shortness of breath, pain with breathing, seizure, confusion, or trouble walking.

## 2019-04-20 NOTE — ED PROVIDER NOTE - OBJECTIVE STATEMENT
73F pmh cholecystectomy, appendectomy, sbo yrs prior p/w syncope early this am. Pt endorses n/v and diarrhea started suddenly yesterday, vomited 8x. Pt spent sig time w/ Grandson who had similar sx earlier in week.   p/w 73F pmh cholecystectomy, appendectomy, sbo yrs prior p/w syncope early this am. Pt endorses n/v and diarrhea started suddenly yesterday, vomited 8x. Pt spent sig time w/ Grandson who had similar sx earlier in week. During vomiting episode, pt had LOC, woke up w/ blood around head, bandaged by , pt went back to sleep. Now concerned may need stitches, feeling fatigued overall, mild headache, no neck pain.  Not on anticoagulation

## 2019-04-20 NOTE — ED ADULT TRIAGE NOTE - OTHER COMPLAINTS
Pt states she has a headache, she fell around 2am, denies any PMH Pt states she has a headache, she fell around 2am, denies any PMH. + LOC

## 2019-04-20 NOTE — ED PROVIDER NOTE - CLINICAL SUMMARY MEDICAL DECISION MAKING FREE TEXT BOX
73F pmh cholecystectomy, appendectomy, sbo yrs prior p/w syncope early this am. Pt endorses n/v and diarrhea started suddenly yesterday, vomited 8x. Pt spent sig time w/ Grandson who had similar sx earlier in week. During vomiting episode, pt had LOC, woke up w/ blood around head, bandaged by , pt went back to sleep. Now concerned may need stitches, feeling fatigued overall, mild headache, no neck pain. Not on anticoagulation. given age, r/o ICH, c spine injury. Stellate Laceration req minor debridement & sutures. CT head/neck pending. 73F pmh cholecystectomy, appendectomy, sbo yrs prior p/w syncope early this am. Pt endorses n/v and diarrhea started suddenly yesterday, vomited 8x. Pt spent sig time w/ Grandson who had similar sx earlier in week. During vomiting episode, pt had LOC, woke up w/ blood around head, bandaged by , pt went back to sleep. Now concerned may need stitches, feeling fatigued overall, mild headache, no neck pain. Not on anticoagulation. given age, r/o ICH, c spine injury. Stellate Laceration req minor debridement & sutures. CT head/neck neg. Feeling much improved, no acute life threatening illness. Pt seeking discharge.

## 2019-04-20 NOTE — ED PROCEDURE NOTE - CPROC ED LACER REPAIR DETAIL1
No foreign body/Multiple flaps were aligned./Non-extensive debridement was performed./The wound was explored to base in bloodless field.

## 2019-04-20 NOTE — ED PROVIDER NOTE - PHYSICAL EXAMINATION
VITAL SIGNS: I have reviewed nursing notes and confirm.  CONSTITUTIONAL: Well-developed; well-nourished; in minimal distress distress.  SKIN: Skin is warm and dry, no acute rash.  HEAD: Normocephalic; R temporal laceration approx 2cm  EYES:  EOM intact; conjunctiva and sclera clear. PERRL  ENT: No nasal discharge; airway clear.  NECK: Supple; Voluntary FROM  CARD: No rubs appreciated, Regular rate and rhythm.  RESP: No wheezes, no rales. No respiratory distress  ABD: Soft; non-distended; non-tender; no rebound or guarding  EXT: Normal ROM. No cyanosis or edema.  NEURO: Alert, oriented. Grossly unremarkable. BUE 5/5, BLE 5/5,  strength 5/5 equal  PSYCH: Cooperative, appropriate. VITAL SIGNS: I have reviewed nursing notes and confirm.  CONSTITUTIONAL: Well-developed; well-nourished; in minimal distress distress.  SKIN: Skin is warm and dry, no acute rash.  HEAD: Normocephalic; R lateral eyebrow laceration approx 4cm stellate  EYES:  EOM intact; conjunctiva and sclera clear. PERRL  ENT: No nasal discharge; airway clear.  NECK: Supple; Voluntary FROM  CARD: No rubs appreciated, Regular rate and rhythm.  RESP: No wheezes, no rales. No respiratory distress  ABD: Soft; non-distended; non-tender; no rebound or guarding  EXT: Normal ROM. No cyanosis or edema.  NEURO: Alert, oriented. Grossly unremarkable. BUE 5/5, BLE 5/5,  strength 5/5 equal  PSYCH: Cooperative, appropriate.

## 2019-04-20 NOTE — ED ADULT NURSE NOTE - NSIMPLEMENTINTERV_GEN_ALL_ED
Implemented All Fall Risk Interventions:  [place] to call system. Call bell, personal items and telephone within reach. Instruct patient to call for assistance. Room bathroom lighting operational. Non-slip footwear when patient is off stretcher. Physically safe environment: no spills, clutter or unnecessary equipment. Stretcher in lowest position, wheels locked, appropriate side rails in place. Provide visual cue, wrist band, yellow gown, etc. Monitor gait and stability. Monitor for mental status changes and reorient to person, place, and time. Review medications for side effects contributing to fall risk. Reinforce activity limits and safety measures with patient and family.

## 2019-04-24 DIAGNOSIS — R55 SYNCOPE AND COLLAPSE: ICD-10-CM

## 2019-04-24 DIAGNOSIS — Z79.2 LONG TERM (CURRENT) USE OF ANTIBIOTICS: ICD-10-CM
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
## 2019-04-24 DIAGNOSIS — R11.2 NAUSEA WITH VOMITING, UNSPECIFIED: ICD-10-CM
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
## 2019-04-24 DIAGNOSIS — Z88.8 ALLERGY STATUS TO OTHER DRUGS, MEDICAMENTS AND BIOLOGICAL SUBSTANCES: ICD-10-CM
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
## 2019-04-24 DIAGNOSIS — Z23 ENCOUNTER FOR IMMUNIZATION: ICD-10-CM
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
## 2019-04-24 DIAGNOSIS — R19.7 DIARRHEA, UNSPECIFIED: ICD-10-CM

## 2019-04-24 DIAGNOSIS — S01.111A LACERATION WITHOUT FOREIGN BODY OF RIGHT EYELID AND PERIOCULAR AREA, INITIAL ENCOUNTER: ICD-10-CM

## 2019-04-27 ENCOUNTER — EMERGENCY (EMERGENCY)
Facility: HOSPITAL | Age: 74
LOS: 1 days | Discharge: HOME | End: 2019-04-27
Admitting: EMERGENCY MEDICINE
Payer: MEDICARE

## 2019-04-27 VITALS
TEMPERATURE: 99 F | RESPIRATION RATE: 18 BRPM | OXYGEN SATURATION: 95 % | SYSTOLIC BLOOD PRESSURE: 123 MMHG | DIASTOLIC BLOOD PRESSURE: 76 MMHG | HEART RATE: 86 BPM

## 2019-04-27 PROCEDURE — 99212 OFFICE O/P EST SF 10 MIN: CPT

## 2019-04-27 NOTE — ED PROVIDER NOTE - OBJECTIVE STATEMENT
72 y/o female with no sig pMHX c/o suture removal. pt seen here after syncopal episode 1 wk ago and sutures place to right eyebrow region. no fever or chills. no d/c. no pain. no ha or dizziness. no further complaints.

## 2019-04-27 NOTE — ED PROVIDER NOTE - CLINICAL SUMMARY MEDICAL DECISION MAKING FREE TEXT BOX
suture removal. no evidence of infection on exam. sutures removed without complications. wound care d/w pt. f/u with pmd

## 2019-04-27 NOTE — ED PROVIDER NOTE - NSFOLLOWUPINSTRUCTIONS_ED_ALL_ED_FT
Follow up with your physician      Wound Closure Removal    The staples, stitches, or skin adhesives that were used to close your skin have been removed. You will need to continue the care described here until the wound is completely healed and your health care provider confirms that wound care can be stopped.    How do I care for my wound?  How you care for your wound after the wound closure has been removed depends on the kind of wound closure you had.    Stitches or Staples     Keep the wound site dry and clean. Do not soak it in water.  If skin adhesive strips were applied after the staples were removed, they will begin to peel off in a few days. Allow them to remain in place until they fall off on their own.  If you still have a bandage (dressing), change it at least once a day or as directed by your health care provider. If the dressing sticks, pour warm, sterile water over it until it loosens and can be removed without pulling apart the wound edges. Pat the area dry with a soft, clean towel. Do not rub the wound because that may cause bleeding.  Apply cream or ointment that stops the growth of bacteria (antibacterial cream or antibacterial ointment) only if your health care provider has directed you to do so.  Place a nonstick bandage over the wound to prevent the dressing from sticking.  Cover the nonstick bandage with a new dressing as directed by your health care provider.  If the bandage becomes wet or dirty or it develops a bad smell, change it as soon as possible.  Take medicines only as directed by your health care provider.    Adhesive Strips or Glue     Adhesive strips and glue peel off on their own.  Leave adhesive strips and glue in place until they fall off.    Are there any bathing restrictions once my wound closure is removed?  Do not take baths, swim, or use a hot tub until your health care provider approves.    How can I decrease the size of my scar?  How your scar heals and the size of your scar depend on many factors, such as your age, the type of scar you have, and genetic factors. The following may help decrease the size of your scar:    Sunscreen. Use sunscreen with a sun protection factor (SPF) of at least 15 when out in the sun. Reapply the sunscreen every two hours.  Friction massage. Once your wound is completely healed, you can gently massage the scarred area. This can decrease scar thickness.    When should I seek help?  Seek help if:    You have a fever.  You have chills.  You have drainage, redness, swelling, or pain at your wound.  There is a bad smell coming from your wound.  Your wound edges open up or do not stay closed after the wound closure has been removed.    This information is not intended to replace advice given to you by your health care provider. Make sure you discuss any questions you have with your health care provider.    Document Released: 11/30/2009 Document Revised: 05/31/2017 Document Reviewed: 05/05/2015  [employer] Interactive Patient Education © 2018 [employer]

## 2019-04-27 NOTE — ED PROVIDER NOTE - PHYSICAL EXAMINATION
lac to right eyebrow region. well healing. sutures intact. no erythema. no edema. no d/c. resolving bruising to right orbital region. no bony tenderness.

## 2019-04-27 NOTE — ED ADULT NURSE NOTE - OBJECTIVE STATEMENT
“You can access the FollowHealth Patient Portal, offered by [facility], by registering with the following website: [email or web address]”
Pt presents to ED for suture removal. Pt with ~7 sutures to R eyebrow s/p syncope a week ago, here for removal today. Wound well approximated, no redness or swelling at site, no drainage from wound. Pt denies pain, fevers/chills, CP/SOB. Pt presents in NAD speaking full sentences ambulatory through triage.

## 2019-05-01 DIAGNOSIS — S01.111D LACERATION WITHOUT FOREIGN BODY OF RIGHT EYELID AND PERIOCULAR AREA, SUBSEQUENT ENCOUNTER: ICD-10-CM
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
## 2019-12-29 NOTE — ED PROVIDER NOTE - NSTIMEPROVIDERCAREINITIATE_GEN_ER
12/29/19 0200   C-SSRS (Frequent Screen)   2. Have you actually had any thoughts of killing yourself? No  (KOKI; pt sleeping)   Suicide Evaluation Negative screen= no ideation, behaviors or history     Pt asleep for C-SSRS assessment. Maintain current plan of care.     21-Jun-2018 16:05

## 2020-02-21 NOTE — ED ADULT NURSE NOTE - WHEN FALL OCCURRED
Patient received semisupine in bed, +IV, in no apparent distress. Patient agreeable to participate in physical therapy evaluation.
last six months

## 2020-03-07 ENCOUNTER — EMERGENCY (EMERGENCY)
Facility: HOSPITAL | Age: 75
LOS: 1 days | Discharge: HOME | End: 2020-03-07
Admitting: EMERGENCY MEDICINE
Payer: MEDICARE

## 2020-03-07 VITALS
DIASTOLIC BLOOD PRESSURE: 70 MMHG | SYSTOLIC BLOOD PRESSURE: 142 MMHG | OXYGEN SATURATION: 95 % | RESPIRATION RATE: 17 BRPM | HEART RATE: 80 BPM | TEMPERATURE: 98 F

## 2020-03-07 VITALS
DIASTOLIC BLOOD PRESSURE: 81 MMHG | OXYGEN SATURATION: 98 % | SYSTOLIC BLOOD PRESSURE: 157 MMHG | HEIGHT: 65 IN | TEMPERATURE: 98 F | RESPIRATION RATE: 18 BRPM | WEIGHT: 125 LBS | HEART RATE: 83 BPM
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
## 2020-03-07 DIAGNOSIS — S93.602A UNSPECIFIED SPRAIN OF LEFT FOOT, INITIAL ENCOUNTER: ICD-10-CM
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
## 2020-03-07 DIAGNOSIS — M79.89 OTHER SPECIFIED SOFT TISSUE DISORDERS: ICD-10-CM

## 2020-03-07 PROCEDURE — 73610 X-RAY EXAM OF ANKLE: CPT | Mod: 26,LT

## 2020-03-07 PROCEDURE — 99284 EMERGENCY DEPT VISIT MOD MDM: CPT

## 2020-03-07 PROCEDURE — 93971 EXTREMITY STUDY: CPT

## 2020-03-07 PROCEDURE — 73630 X-RAY EXAM OF FOOT: CPT

## 2020-03-07 PROCEDURE — 93971 EXTREMITY STUDY: CPT | Mod: 26,LT

## 2020-03-07 PROCEDURE — 99284 EMERGENCY DEPT VISIT MOD MDM: CPT | Mod: 25

## 2020-03-07 PROCEDURE — 73630 X-RAY EXAM OF FOOT: CPT | Mod: 26,LT

## 2020-03-07 PROCEDURE — 73610 X-RAY EXAM OF ANKLE: CPT

## 2020-03-07 RX ORDER — IBUPROFEN 200 MG
600 TABLET ORAL ONCE
Refills: 0 | Status: COMPLETED | OUTPATIENT
Start: 2020-03-07 | End: 2020-03-07

## 2020-03-07 RX ADMIN — Medication 600 MILLIGRAM(S): at 04:05

## 2020-03-07 RX ADMIN — Medication 600 MILLIGRAM(S): at 04:10

## 2020-03-07 NOTE — ED ADULT NURSE NOTE - OBJECTIVE STATEMENT
pt. presents with c/o acute onset lt dorsal foot pain and mild swelling, a-traumatic in nature, states noticed at 2100 this evening, dp/pt intact 2+, no edema noted to foot, trace edema noted to bl lower legs, denies fever or other complaint, reports pcp told her she might need eval to r/o dvt

## 2020-03-07 NOTE — ED PROVIDER NOTE - OBJECTIVE STATEMENT
73 yo female in the ER c/o pain, swelling to her left lower leg/ankle. Pt states she noted it tonight. Can not recall any injury to her foot or ankle, denies numbness, tingling , weakness  to her left LE. Pt states her doctor recommend pt to be evaluated in the ER for DVT. Pt denies SOB, CP, fever, chills, denies any joints pain or any rash.

## 2020-03-07 NOTE — ED ADULT NURSE NOTE - CHPI ED NUR SYMPTOMS NEG
no dizziness/no fever/no nausea/no back pain/no diaphoresis/no chills/no chest pain/no syncope/no shortness of breath/no vomiting/no congestion

## 2020-03-07 NOTE — ED PROVIDER NOTE - MUSCULOSKELETAL, MLM
Spine and all extremities grossly appears normal, range of motion is not limited,  tenderness to left ankle and foot, slight edema, good distal pulses, slight ecchymosis to dorsal aspect of foot, lust below her left lateral malleoli, NROM,

## 2020-03-07 NOTE — ED ADULT NURSE NOTE - NS_NURSE_DISC_TEACHING_YN_ED_ALL_ED
Diarrhea    Diarrhea is frequent loose or watery bowel movements that has many causes. Diarrhea can make you feel weak and cause you to become dehydrated.. Drink clear fluids to prevent dehydration. Eat bland, easy-to-digest foods as tolerated. monitor the amount of wet diapers or voids to ensure you or patient are well hydrated. If diarrhea persists more then 5 days follow up with pmd for possible stool cultures.    SEEK IMMEDIATE MEDICAL CARE IF YOU HAVE ANY OF THE FOLLOWING SYMPTOMS: high fevers, lightheadedness/dizziness, chest pain, black or bloody stools, shortness of breath, severe abdominal or back pain, or any signs of dehydration.    Abdominal Pain    Many things can cause abdominal pain. . Your health care provider will do a physical exam to determine if there is a dangerous cause of your pain; blood tests and imaging can sometimes help determine the cause of your pain. However, in many cases, no cause may be found and you may need further testing as an outpatient. Monitor your abdominal pain for any changes.     SEEK IMMEDIATE MEDICAL CARE IF YOU HAVE ANY OF THE FOLLOWING SYMPTOMS: worsening abdominal pain, uncontrollable vomiting, profuse diarrhea, inability to have bowel movements or pass gas, black or bloody stools, fever accompanying chest pain or back pain, or fainting. These symptoms may represent a serious problem that is an emergency. Do not wait to see if the symptoms will go away. Get medical help right away. Call 911 and do not drive yourself to the hospital. Yes

## 2020-03-07 NOTE — ED ADULT NURSE REASSESSMENT NOTE - NS ED NURSE REASSESS COMMENT FT1
assessment as noted, nad, awaiting provider eval/orders, pt. utd on poc, with understanding verbalized
back from xr, nad or change, awaiting dispo., pt. aware, with understanding verbalized
pt. back from US, nad or change, awaiting results/dispo., pt. aware, with understanding verbalized

## 2020-03-07 NOTE — ED PROVIDER NOTE - CLINICAL SUMMARY MEDICAL DECISION MAKING FREE TEXT BOX
75 yo female in the ER due to left lower leg pain and edema. Small ecchymosis noted to latero=dorsal aspect, edema over the lateral malleoli. Pt denies any injury to her left LE, sent to ER by PMD to r/o DVT. doppler done- no evidence of DVT, xray of foot and ankle done- no acute fx. suspect sprain. plan: NSAIds for pain, rest, elevation, ortho f/u.

## 2020-03-07 NOTE — ED PROVIDER NOTE - PATIENT PORTAL LINK FT
You can access the FollowMyHealth Patient Portal offered by [facility] by registering at the following website: [email or web address]. By joining [employer]’[facility] FollowMyHealth portal, you will also be able to view your health information using other applications (apps) compatible with our system.

## 2020-03-07 NOTE — ED PROVIDER NOTE - NSFOLLOWUPINSTRUCTIONS_ED_ALL_ED_FT
I have discussed the discharge plan with the patient. The patient agrees with the plan, as discussed.  The patient understands Emergency Department diagnosis is a preliminary diagnosis often based on limited information and that the patient must adhere to the follow-up plan as discussed.  The patient understands that if the symptoms worsen or if prescribed medications do not have the desired/planned effect that the patient may return to the Emergency Department at any time for further evaluation and treatment.      Foot Sprain     A foot sprain is an injury to one of the strong bands of tissue (ligaments) that connect and support the bones in your feet. The ligament can be stretched too much and tear. A tear can be either partial or complete. The severity of the sprain depends on how much of the ligament was damaged or torn.  What are the causes?  This condition is usually caused by suddenly twisting or pivoting your foot.  What increases the risk?  This injury is more likely to occur in people who:  Play a sport, such as basketball or football.Exercise or play a sport without warming up.Start a new workout or sport.Suddenly increase how long or hard they exercise or play a sport.Have previously injured their foot or ankle.What are the signs or symptoms?  Symptoms of this condition start soon after an injury and include:  Pain, especially in the arch of the foot.Bruising.Swelling.Inability to walk or use the foot to support body weight.How is this diagnosed?  This condition is diagnosed with a medical history and physical exam. You may also have imaging tests, such as:  X-rays to make sure there are no broken bones (fractures).An MRI to see if the ligament is torn.How is this treated?  Treatment for this condition depends on the severity of the sprain.  Mild sprains can be treated with:  Rest, ice, compression, and elevation (RICE). Keeping your foot in a fixed position (immobilization) for a period of time. This is done if your ligament is overstretched or partially torn. Your health care provider will apply a bandage, splint, or walking boot to keep your foot from moving until it heals.Using crutches or a scooter for a few weeks to avoid putting weight on your foot while it is healing.Major sprains can be treated with:  Surgery. This is done if your ligament is fully torn and a procedure is needed to reconnect it to the bone.A cast or splint. This will be needed after surgery. A cast or splint will need to stay on your foot while it heals.In both types of sprains, you may need to exercise or have physical therapy to strengthen your foot.Follow these instructions at home:  If you have a bandage, splint, or boot:     Wear the bandage, splint, or boot as told by your health care provider. Remove only as told by your health care provider.Loosen the bandage, splint, or boot if your toes tingle, become numb, or turn cold and blue.Keep the bandage, splint, or boot clean and dry.If you have a cast:     Do not stick anything inside the cast to scratch your skin. Doing that increases your risk for infection.Check the skin around the cast every day. Tell your health care provider about any concerns.You may put lotion on dry skin around the edges of the cast. Do not put lotion on the skin underneath the cast.Keep the cast clean and dry.Bathing     Do not take baths, swim, or use a hot tub until your health care provider approves. Ask your health care provider if you may take showers. You may only be allowed to take sponge baths.If the bandage, splint, boot, or cast is not waterproof:  Do not let it get wet.Cover it with a watertight covering when you take a shower.Managing pain, stiffness, and swelling        If directed, put ice on the injured area:  If you have a removable splint, boot, or immobilizer, remove it as told by your health care provider.Put ice in a plastic bag.Place a towel between your skin and the bag.Leave the ice on for 20 minutes, 2–3 times per day.Move your toes often to avoid stiffness and to lessen swelling.Raise (elevate) the injured area above the level of your heart while you are sitting or lying down.Driving     Do not drive or operate heavy machinery while taking pain medicine.Ask your health care provider when it is safe to drive if you have a bandage, splint, or walking boot on your foot.Activity     Do not use the injured foot to support your body weight until your health care provider says that you can. Use crutches or other supportive devices as directed by your health care provider.Ask your health care provider what activities are safe for you. Do any exercise or physical therapy as directed.Gradually increase how much and how far you walk until your health care provider says it is safe to return to full activity.General instructions     If you have a cast, do not put pressure on any part of it until it is fully hardened. This may take several hours.Take over-the-counter and prescription medicines only as told by your health care provider.When you can walk without pain, wear supportive shoes that have stiff soles. Do not wear flip-flops, and do not walk barefoot.Keep all follow-up visits as told by your health care provider. This is important.Contact a health care provider if:  Your pain is not controlled with medicine.Your bruising or swelling gets worse or does not get better with treatment.Your splint, boot, or cast is damaged.Get help right away if:  You develop severe numbness or tingling in your foot.Your foot turns blue, white, or gray, and it feels cold.Summary  A foot sprain is an injury to one of the strong bands of tissue (ligaments) that connect and support the bones in your feet.Your health care provider may recommend a splint or boot for your foot to support it while it heals. In some cases, surgery may be needed.Physical therapy can help keep your other muscles strong until your foot gets better.This information is not intended to replace advice given to you by your health care provider. Make sure you discuss any questions you have with your health care provider.              LEG EDEMA - AfterCare(R) Instructions(ER/ED)     Leg Edema    WHAT YOU NEED TO KNOW:    Leg edema is swelling caused by fluid buildup. Your legs may swell if you sit or stand for long periods of time, are pregnant, or are injured. Swelling may also occur if you have heart failure or circulation problems. This means that your heart does not pump blood through your body as it should.    DISCHARGE INSTRUCTIONS:    Self-care:     Elevate your legs: Raise your legs above the level of your heart as often as you can. This will help decrease swelling and pain. Prop your legs on pillows or blankets to keep them elevated comfortably.      Wear pressure stockings: These tight stockings put pressure on your legs to promote blood flow and prevent blood clots. Wear the stockings during the day. Do not wear them while you sleep.      Apply heat: Heat helps decrease pain and swelling. Apply heat on the area for 20 to 30 minutes every 2 hours for as many days as directed.       Stay active: Do not stand or sit for long periods of time. Ask your healthcare provider about the best exercise plan for you.      Eat healthy foods: Healthy foods include fruits, vegetables, whole-grain breads, low-fat dairy products, beans, lean meats, and fish. Ask if you need to be on a special diet. Limit salt. Salt will make your body hold even more fluid.    Follow up with your healthcare provider as directed: Write down your questions so you remember to ask them during your visits.     Contact your healthcare provider if:     You have a fever or feel more tired than usual.      The veins in your legs look larger than usual. They may look full or bulging.      Your legs itch or feel heavy.      You have red or white areas or sores on your legs. The skin may also appear dimpled or have indentations.      You are gaining weight.      You have trouble moving your ankles.      The swelling does not go away, or other parts of your body swell.      You have questions or concerns about your condition or care.    Return to the emergency department if:     You cannot walk.      You feel faint or confused.       Your skin turns blue or gray.      Your leg feels warm, tender, and painful. It may be swollen and red.      You have chest pain or trouble breathing that is worse when you lie down.      You suddenly feel lightheaded and have trouble breathing.      You have new and sudden chest pain. You may have more pain when you take deep breaths or cough. You may also cough up blood.

## 2020-03-07 NOTE — ED PROVIDER NOTE - CARDIAC, MLM
Hpi Title: Evaluation of a Skin Lesion How Severe Are Your Spot(S)?: mild Have Your Spot(S) Been Treated In The Past?: has not been treated Normal rate, regular rhythm.   No murmurs, rubs or gallops.

## 2021-02-22 NOTE — ED PROVIDER NOTE - EYES, MLM
Clear bilaterally, pupils equal, round and reactive to light. Lip Wedge Excision Repair Text: Given the location of the defect and the proximity to free margins a full thickness wedge repair was deemed most appropriate.  Using a sterile surgical marker, the appropriate repair was drawn incorporating the defect and placing the expected incisions perpendicular to the vermilion border.  The vermilion border was also meticulously outlined to ensure appropriate reapproximation during the repair.  The area thus outlined was incised through and through with a #15 scalpel blade.  The muscularis and dermis were reaproximated with deep sutures following hemostasis. Care was taken to realign the vermilion border before proceeding with the superficial closure.  Once the vermilion was realigned the superfical and mucosal closure was finished.

## 2021-07-06 NOTE — ED ADULT NURSE NOTE - CINV DISCH MEDS REVIEWED YN
From: Ashley Newberry  To: Jose Pacheco  Sent: 7/2/2021 5:32 PM CDT  Subject: Non-Urgent Medical Question    I have been having swelling in my whole body. Most painfully in my legs and ankles. Sometimes just my left leg. Is this something that could be heart related?  Ashley Newberry   Yes

## 2021-07-13 ENCOUNTER — TRANSCRIPTION ENCOUNTER (OUTPATIENT)
Age: 76
End: 2021-07-13

## 2021-07-13 VITALS
HEART RATE: 60 BPM | SYSTOLIC BLOOD PRESSURE: 139 MMHG | TEMPERATURE: 98 F | OXYGEN SATURATION: 97 % | WEIGHT: 125 LBS | RESPIRATION RATE: 16 BRPM | DIASTOLIC BLOOD PRESSURE: 72 MMHG | HEIGHT: 65 IN

## 2021-07-13 LAB
ALBUMIN SERPL ELPH-MCNC: 4.6 G/DL — SIGNIFICANT CHANGE UP (ref 3.3–5)
ALP SERPL-CCNC: 97 U/L — SIGNIFICANT CHANGE UP (ref 40–120)
ALT FLD-CCNC: 32 U/L — SIGNIFICANT CHANGE UP (ref 10–45)
ANION GAP SERPL CALC-SCNC: 14 MMOL/L — SIGNIFICANT CHANGE UP (ref 5–17)
AST SERPL-CCNC: 39 U/L — SIGNIFICANT CHANGE UP (ref 10–40)
BASOPHILS # BLD AUTO: 0.06 K/UL — SIGNIFICANT CHANGE UP (ref 0–0.2)
BASOPHILS NFR BLD AUTO: 0.4 % — SIGNIFICANT CHANGE UP (ref 0–2)
BILIRUB SERPL-MCNC: 0.4 MG/DL — SIGNIFICANT CHANGE UP (ref 0.2–1.2)
BUN SERPL-MCNC: 17 MG/DL — SIGNIFICANT CHANGE UP (ref 7–23)
CALCIUM SERPL-MCNC: 9.9 MG/DL — SIGNIFICANT CHANGE UP (ref 8.4–10.5)
CHLORIDE SERPL-SCNC: 100 MMOL/L — SIGNIFICANT CHANGE UP (ref 96–108)
CO2 SERPL-SCNC: 25 MMOL/L — SIGNIFICANT CHANGE UP (ref 22–31)
CREAT SERPL-MCNC: 0.76 MG/DL — SIGNIFICANT CHANGE UP (ref 0.5–1.3)
EOSINOPHIL # BLD AUTO: 0.09 K/UL — SIGNIFICANT CHANGE UP (ref 0–0.5)
EOSINOPHIL NFR BLD AUTO: 0.7 % — SIGNIFICANT CHANGE UP (ref 0–6)
GLUCOSE SERPL-MCNC: 116 MG/DL — HIGH (ref 70–99)
HCT VFR BLD CALC: 43.7 % — SIGNIFICANT CHANGE UP (ref 34.5–45)
HGB BLD-MCNC: 14.2 G/DL — SIGNIFICANT CHANGE UP (ref 11.5–15.5)
IMM GRANULOCYTES NFR BLD AUTO: 0.4 % — SIGNIFICANT CHANGE UP (ref 0–1.5)
LIDOCAIN IGE QN: 20 U/L — SIGNIFICANT CHANGE UP (ref 7–60)
LYMPHOCYTES # BLD AUTO: 1.46 K/UL — SIGNIFICANT CHANGE UP (ref 1–3.3)
LYMPHOCYTES # BLD AUTO: 10.8 % — LOW (ref 13–44)
MCHC RBC-ENTMCNC: 30.5 PG — SIGNIFICANT CHANGE UP (ref 27–34)
MCHC RBC-ENTMCNC: 32.5 GM/DL — SIGNIFICANT CHANGE UP (ref 32–36)
MCV RBC AUTO: 94 FL — SIGNIFICANT CHANGE UP (ref 80–100)
MONOCYTES # BLD AUTO: 0.99 K/UL — HIGH (ref 0–0.9)
MONOCYTES NFR BLD AUTO: 7.3 % — SIGNIFICANT CHANGE UP (ref 2–14)
NEUTROPHILS # BLD AUTO: 10.82 K/UL — HIGH (ref 1.8–7.4)
NEUTROPHILS NFR BLD AUTO: 80.4 % — HIGH (ref 43–77)
NRBC # BLD: 0 /100 WBCS — SIGNIFICANT CHANGE UP (ref 0–0)
PLATELET # BLD AUTO: 274 K/UL — SIGNIFICANT CHANGE UP (ref 150–400)
POTASSIUM SERPL-MCNC: 3.9 MMOL/L — SIGNIFICANT CHANGE UP (ref 3.5–5.3)
POTASSIUM SERPL-SCNC: 3.9 MMOL/L — SIGNIFICANT CHANGE UP (ref 3.5–5.3)
PROT SERPL-MCNC: 7.9 G/DL — SIGNIFICANT CHANGE UP (ref 6–8.3)
RBC # BLD: 4.65 M/UL — SIGNIFICANT CHANGE UP (ref 3.8–5.2)
RBC # FLD: 12.7 % — SIGNIFICANT CHANGE UP (ref 10.3–14.5)
SARS-COV-2 RNA SPEC QL NAA+PROBE: NEGATIVE — SIGNIFICANT CHANGE UP
SODIUM SERPL-SCNC: 139 MMOL/L — SIGNIFICANT CHANGE UP (ref 135–145)
WBC # BLD: 13.48 K/UL — HIGH (ref 3.8–10.5)
WBC # FLD AUTO: 13.48 K/UL — HIGH (ref 3.8–10.5)

## 2021-07-13 PROCEDURE — 74177 CT ABD & PELVIS W/CONTRAST: CPT | Mod: 26,MA

## 2021-07-13 PROCEDURE — 99285 EMERGENCY DEPT VISIT HI MDM: CPT

## 2021-07-13 PROCEDURE — 93010 ELECTROCARDIOGRAM REPORT: CPT

## 2021-07-13 RX ORDER — MORPHINE SULFATE 50 MG/1
4 CAPSULE, EXTENDED RELEASE ORAL ONCE
Refills: 0 | Status: DISCONTINUED | OUTPATIENT
Start: 2021-07-13 | End: 2021-07-13

## 2021-07-13 RX ORDER — IOHEXOL 300 MG/ML
30 INJECTION, SOLUTION INTRAVENOUS ONCE
Refills: 0 | Status: COMPLETED | OUTPATIENT
Start: 2021-07-13 | End: 2021-07-13

## 2021-07-13 RX ORDER — ONDANSETRON 8 MG/1
4 TABLET, FILM COATED ORAL ONCE
Refills: 0 | Status: COMPLETED | OUTPATIENT
Start: 2021-07-13 | End: 2021-07-13

## 2021-07-13 RX ORDER — SODIUM CHLORIDE 9 MG/ML
1000 INJECTION INTRAMUSCULAR; INTRAVENOUS; SUBCUTANEOUS ONCE
Refills: 0 | Status: COMPLETED | OUTPATIENT
Start: 2021-07-13 | End: 2021-07-13

## 2021-07-13 RX ADMIN — SODIUM CHLORIDE 1000 MILLILITER(S): 9 INJECTION INTRAMUSCULAR; INTRAVENOUS; SUBCUTANEOUS at 19:35

## 2021-07-13 RX ADMIN — MORPHINE SULFATE 4 MILLIGRAM(S): 50 CAPSULE, EXTENDED RELEASE ORAL at 19:36

## 2021-07-13 RX ADMIN — ONDANSETRON 4 MILLIGRAM(S): 8 TABLET, FILM COATED ORAL at 19:36

## 2021-07-13 RX ADMIN — MORPHINE SULFATE 4 MILLIGRAM(S): 50 CAPSULE, EXTENDED RELEASE ORAL at 21:57

## 2021-07-13 RX ADMIN — IOHEXOL 30 MILLILITER(S): 300 INJECTION, SOLUTION INTRAVENOUS at 19:36

## 2021-07-13 NOTE — ED ADULT TRIAGE NOTE - CHIEF COMPLAINT QUOTE
Pt c/o lower abdominal cramping over past few days with nausea and one episode of vomiting. Hx of appendectomy, cholecystectomy. Denies fevers, chills. Reports diarrhea x1 today, denies blood in emesis/stool/

## 2021-07-13 NOTE — ED PROVIDER NOTE - PROGRESS NOTE DETAILS
Left message for Dr Burciaga at this time. D/w Dr Crenshaw, PCP, aware. Surgery consulted and will see the pt Surgery evaluating pt. Attending over-read: closed loop obstruction. No vomiting in the ED. Hemodynamically stable. Lactate / coags / T&S added. Anticipate admission pt for admission to surgery, regional bed, Dr Mendoza. For CLARENCE brambila for ex-lap

## 2021-07-13 NOTE — ED PROVIDER NOTE - PHYSICAL EXAMINATION
Constitutional: Well appearing, well nourished, awake, alert, oriented to person, place, time/situation and in no apparent distress.  ENMT: Airway patent. Normal MM  Eyes: Clear bilaterally  Cardiac: Normal rate, regular rhythm.  Heart sounds S1, S2.  No murmurs, rubs or gallops.  Respiratory: Breaths sounds equal and clear b/l. No increased WOB, tachypnea, hypoxia, or accessory mm use. Pt speaks in full sentences.   Gastrointestinal: Abd soft, ND, NABS. + ttp in R mid, RLQ, suprapubic region. No guarding, rebound, or rigidity. No pulsatile abdominal masses. No organomegaly appreciated. No CVAT   Musculoskeletal: Range of motion is not limited  Neuro: Alert and oriented x 3, face symmetric and speech fluent. Strength 5/5 x 4 ext and symmetric, nml gross motor movement, nml gait. No focal deficits noted.  Skin: Skin normal color for race, warm, dry and intact. No evidence of rash.  Psych: Alert and oriented to person, place, time/situation. normal mood and affect. no apparent risk to self or others.

## 2021-07-13 NOTE — ED ADULT NURSE REASSESSMENT NOTE - NS ED NURSE REASSESS COMMENT FT1
while passing BM in pt bathroom, pt felt light-headed and dizzy. PT palced on wheelchair brought back to stretcher. FS and EKG complete. MD field made aware,.

## 2021-07-13 NOTE — ED CLERICAL - NS ED CLERK NOTE PRE-ARRIVAL INFORMATION; ADDITIONAL PRE-ARRIVAL INFORMATION
Severe Abdominal Pain, Evaluation needed  Please call Dr. Burciaga severe abd pain  poss bowel obstruction  Please call Dr. Burciaga

## 2021-07-13 NOTE — ED PROVIDER NOTE - CLINICAL SUMMARY MEDICAL DECISION MAKING FREE TEXT BOX
Pt p/w abd pain, diarrhea. DDx includes but not limited to partial SBO, SBO, colitis, less likely other pathology. Check labs, EKG, CT a/p, IVF, NPO, analgesia, UA. Dispo pending w/u and clinical status

## 2021-07-13 NOTE — ED ADULT NURSE NOTE - OBJECTIVE STATEMENT
75 y.o F a&ox4 walked in from triage c.o abdominal pain. x 1 day of abdominal pain, nausea, and cramping. denies fevers, chills, hematuria, urinary complaints, diarrhea. speaking in clear appropriate sentences, airway patent. breathing unlabored. PMH ileocecal bowel, appendectomy, cholecystectomy 75 y.o F a&ox4 walked in from triage c.o abdominal pain. x 1 day of abdominal pain, nausea, and cramping. + watery brown diarrhea. sent in for rule out Small bowel obstruction.  denies fevers, chills, hematuria, urinary complaints. speaking in clear appropriate sentences, airway patent. breathing unlabored. PMH ileocecal bowel, appendectomy, cholecystectomy

## 2021-07-13 NOTE — ED PROVIDER NOTE - OBJECTIVE STATEMENT
Pt w/ PSHx appendectomy (reports she has no ileocecal valve s/p appendectomy), negrito Pt w/ PSHx appendectomy (reports she has no ileocecal valve s/p appendectomy for which she takes cholestyramine BID to prevent diarrhea), cholecystectomy, p/w abd pain, onset this morning. Pt reports she had normal BM this morning, followed by onset of R sided abd pain, that has since become more generalized, constant, cramping. + nausea w/ regurgitation of her coffee this morning. No other vomiting today. Pt has had dec appetite and has not taken her cholestyramine. She reports onset of watery, non bloody diarrhea, which has become more watery throughout the day. + dec flatus. No f/c. No urinary sx. No flank or back pain. No CP, SOB.

## 2021-07-14 ENCOUNTER — INPATIENT (INPATIENT)
Facility: HOSPITAL | Age: 76
LOS: 4 days | Discharge: HOME | DRG: 336 | End: 2021-07-19
Attending: SURGERY | Admitting: SURGERY
Payer: MEDICARE

## 2021-07-14 ENCOUNTER — RESULT REVIEW (OUTPATIENT)
Age: 76
End: 2021-07-14

## 2021-07-14 DIAGNOSIS — Z90.721 ACQUIRED ABSENCE OF OVARIES, UNILATERAL: Chronic | ICD-10-CM

## 2021-07-14 DIAGNOSIS — Z90.49 ACQUIRED ABSENCE OF OTHER SPECIFIED PARTS OF DIGESTIVE TRACT: Chronic | ICD-10-CM

## 2021-07-14 DIAGNOSIS — Z98.890 OTHER SPECIFIED POSTPROCEDURAL STATES: Chronic | ICD-10-CM

## 2021-07-14 DIAGNOSIS — Z88.1 ALLERGY STATUS TO OTHER ANTIBIOTIC AGENTS STATUS: ICD-10-CM

## 2021-07-14 DIAGNOSIS — K56.50 INTESTINAL ADHESIONS [BANDS], UNSPECIFIED AS TO PARTIAL VERSUS COMPLETE OBSTRUCTION: ICD-10-CM

## 2021-07-14 DIAGNOSIS — D62 ACUTE POSTHEMORRHAGIC ANEMIA: ICD-10-CM

## 2021-07-14 DIAGNOSIS — J45.909 UNSPECIFIED ASTHMA, UNCOMPLICATED: ICD-10-CM

## 2021-07-14 LAB
ANION GAP SERPL CALC-SCNC: 10 MMOL/L — SIGNIFICANT CHANGE UP (ref 5–17)
APPEARANCE UR: CLEAR — SIGNIFICANT CHANGE UP
APTT BLD: 26.8 SEC — LOW (ref 27.5–35.5)
BACTERIA # UR AUTO: PRESENT /HPF
BILIRUB UR-MCNC: NEGATIVE — SIGNIFICANT CHANGE UP
BLD GP AB SCN SERPL QL: NEGATIVE — SIGNIFICANT CHANGE UP
BLD GP AB SCN SERPL QL: NEGATIVE — SIGNIFICANT CHANGE UP
BUN SERPL-MCNC: 12 MG/DL — SIGNIFICANT CHANGE UP (ref 7–23)
CALCIUM SERPL-MCNC: 9 MG/DL — SIGNIFICANT CHANGE UP (ref 8.4–10.5)
CHLORIDE SERPL-SCNC: 103 MMOL/L — SIGNIFICANT CHANGE UP (ref 96–108)
CO2 SERPL-SCNC: 26 MMOL/L — SIGNIFICANT CHANGE UP (ref 22–31)
COLOR SPEC: YELLOW — SIGNIFICANT CHANGE UP
CREAT SERPL-MCNC: 0.65 MG/DL — SIGNIFICANT CHANGE UP (ref 0.5–1.3)
DIFF PNL FLD: ABNORMAL
EPI CELLS # UR: SIGNIFICANT CHANGE UP /HPF (ref 0–5)
GLUCOSE SERPL-MCNC: 124 MG/DL — HIGH (ref 70–99)
GLUCOSE UR QL: NEGATIVE — SIGNIFICANT CHANGE UP
HCT VFR BLD CALC: 38.5 % — SIGNIFICANT CHANGE UP (ref 34.5–45)
HGB BLD-MCNC: 12.3 G/DL — SIGNIFICANT CHANGE UP (ref 11.5–15.5)
INR BLD: 0.93 — SIGNIFICANT CHANGE UP (ref 0.88–1.16)
KETONES UR-MCNC: NEGATIVE — SIGNIFICANT CHANGE UP
LACTATE SERPL-SCNC: 1.7 MMOL/L — SIGNIFICANT CHANGE UP (ref 0.5–2)
LACTATE SERPL-SCNC: 2.3 MMOL/L — HIGH (ref 0.5–2)
LEUKOCYTE ESTERASE UR-ACNC: ABNORMAL
MAGNESIUM SERPL-MCNC: 1.8 MG/DL — SIGNIFICANT CHANGE UP (ref 1.6–2.6)
MCHC RBC-ENTMCNC: 30.4 PG — SIGNIFICANT CHANGE UP (ref 27–34)
MCHC RBC-ENTMCNC: 31.9 GM/DL — LOW (ref 32–36)
MCV RBC AUTO: 95.3 FL — SIGNIFICANT CHANGE UP (ref 80–100)
NITRITE UR-MCNC: NEGATIVE — SIGNIFICANT CHANGE UP
NRBC # BLD: 0 /100 WBCS — SIGNIFICANT CHANGE UP (ref 0–0)
PH UR: 5.5 — SIGNIFICANT CHANGE UP (ref 5–8)
PHOSPHATE SERPL-MCNC: 3.5 MG/DL — SIGNIFICANT CHANGE UP (ref 2.5–4.5)
PLATELET # BLD AUTO: 156 K/UL — SIGNIFICANT CHANGE UP (ref 150–400)
POTASSIUM SERPL-MCNC: 4.2 MMOL/L — SIGNIFICANT CHANGE UP (ref 3.5–5.3)
POTASSIUM SERPL-SCNC: 4.2 MMOL/L — SIGNIFICANT CHANGE UP (ref 3.5–5.3)
PROT UR-MCNC: NEGATIVE MG/DL — SIGNIFICANT CHANGE UP
PROTHROM AB SERPL-ACNC: 11.2 SEC — SIGNIFICANT CHANGE UP (ref 10.6–13.6)
RBC # BLD: 4.04 M/UL — SIGNIFICANT CHANGE UP (ref 3.8–5.2)
RBC # FLD: 12.9 % — SIGNIFICANT CHANGE UP (ref 10.3–14.5)
RBC CASTS # UR COMP ASSIST: < 5 /HPF — SIGNIFICANT CHANGE UP
RH IG SCN BLD-IMP: POSITIVE — SIGNIFICANT CHANGE UP
RH IG SCN BLD-IMP: POSITIVE — SIGNIFICANT CHANGE UP
SODIUM SERPL-SCNC: 139 MMOL/L — SIGNIFICANT CHANGE UP (ref 135–145)
SP GR SPEC: 1.01 — SIGNIFICANT CHANGE UP (ref 1–1.03)
UROBILINOGEN FLD QL: 0.2 E.U./DL — SIGNIFICANT CHANGE UP
WBC # BLD: 6.3 K/UL — SIGNIFICANT CHANGE UP (ref 3.8–10.5)
WBC # FLD AUTO: 6.3 K/UL — SIGNIFICANT CHANGE UP (ref 3.8–10.5)
WBC UR QL: < 5 /HPF — SIGNIFICANT CHANGE UP

## 2021-07-14 PROCEDURE — 88304 TISSUE EXAM BY PATHOLOGIST: CPT | Mod: 26

## 2021-07-14 PROCEDURE — 71045 X-RAY EXAM CHEST 1 VIEW: CPT | Mod: 26

## 2021-07-14 RX ORDER — SODIUM CHLORIDE 9 MG/ML
1000 INJECTION, SOLUTION INTRAVENOUS
Refills: 0 | Status: DISCONTINUED | OUTPATIENT
Start: 2021-07-14 | End: 2021-07-17

## 2021-07-14 RX ORDER — ONDANSETRON 8 MG/1
4 TABLET, FILM COATED ORAL EVERY 6 HOURS
Refills: 0 | Status: DISCONTINUED | OUTPATIENT
Start: 2021-07-14 | End: 2021-07-19

## 2021-07-14 RX ORDER — BENZOCAINE AND MENTHOL 5; 1 G/100ML; G/100ML
1 LIQUID ORAL THREE TIMES A DAY
Refills: 0 | Status: DISCONTINUED | OUTPATIENT
Start: 2021-07-14 | End: 2021-07-19

## 2021-07-14 RX ORDER — BUPIVACAINE 13.3 MG/ML
20 INJECTION, SUSPENSION, LIPOSOMAL INFILTRATION ONCE
Refills: 0 | Status: DISCONTINUED | OUTPATIENT
Start: 2021-07-14 | End: 2021-07-19

## 2021-07-14 RX ORDER — HEPARIN SODIUM 5000 [USP'U]/ML
5000 INJECTION INTRAVENOUS; SUBCUTANEOUS EVERY 8 HOURS
Refills: 0 | Status: DISCONTINUED | OUTPATIENT
Start: 2021-07-14 | End: 2021-07-19

## 2021-07-14 RX ORDER — HYDROMORPHONE HYDROCHLORIDE 2 MG/ML
0.5 INJECTION INTRAMUSCULAR; INTRAVENOUS; SUBCUTANEOUS EVERY 6 HOURS
Refills: 0 | Status: DISCONTINUED | OUTPATIENT
Start: 2021-07-14 | End: 2021-07-17

## 2021-07-14 RX ORDER — BENZOCAINE AND MENTHOL 5; 1 G/100ML; G/100ML
1 LIQUID ORAL THREE TIMES A DAY
Refills: 0 | Status: DISCONTINUED | OUTPATIENT
Start: 2021-07-14 | End: 2021-07-14

## 2021-07-14 RX ORDER — ACETAMINOPHEN 500 MG
1000 TABLET ORAL ONCE
Refills: 0 | Status: COMPLETED | OUTPATIENT
Start: 2021-07-14 | End: 2021-07-14

## 2021-07-14 RX ORDER — MAGNESIUM SULFATE 500 MG/ML
1 VIAL (ML) INJECTION ONCE
Refills: 0 | Status: COMPLETED | OUTPATIENT
Start: 2021-07-14 | End: 2021-07-14

## 2021-07-14 RX ORDER — ACETAMINOPHEN 500 MG
1000 TABLET ORAL EVERY 6 HOURS
Refills: 0 | Status: COMPLETED | OUTPATIENT
Start: 2021-07-14 | End: 2021-07-14

## 2021-07-14 RX ORDER — ACETAMINOPHEN 500 MG
1000 TABLET ORAL ONCE
Refills: 0 | Status: COMPLETED | OUTPATIENT
Start: 2021-07-14 | End: 2021-07-15

## 2021-07-14 RX ORDER — SODIUM CHLORIDE 9 MG/ML
1000 INJECTION, SOLUTION INTRAVENOUS ONCE
Refills: 0 | Status: COMPLETED | OUTPATIENT
Start: 2021-07-14 | End: 2021-07-14

## 2021-07-14 RX ADMIN — SODIUM CHLORIDE 100 MILLILITER(S): 9 INJECTION, SOLUTION INTRAVENOUS at 01:59

## 2021-07-14 RX ADMIN — SODIUM CHLORIDE 2000 MILLILITER(S): 9 INJECTION, SOLUTION INTRAVENOUS at 03:08

## 2021-07-14 RX ADMIN — HEPARIN SODIUM 5000 UNIT(S): 5000 INJECTION INTRAVENOUS; SUBCUTANEOUS at 22:24

## 2021-07-14 RX ADMIN — SODIUM CHLORIDE 100 MILLILITER(S): 9 INJECTION, SOLUTION INTRAVENOUS at 08:38

## 2021-07-14 RX ADMIN — Medication 400 MILLIGRAM(S): at 05:34

## 2021-07-14 RX ADMIN — MORPHINE SULFATE 4 MILLIGRAM(S): 50 CAPSULE, EXTENDED RELEASE ORAL at 05:38

## 2021-07-14 RX ADMIN — Medication 400 MILLIGRAM(S): at 16:41

## 2021-07-14 RX ADMIN — BENZOCAINE AND MENTHOL 1 LOZENGE: 5; 1 LIQUID ORAL at 22:24

## 2021-07-14 RX ADMIN — Medication 1000 MILLIGRAM(S): at 06:14

## 2021-07-14 RX ADMIN — HEPARIN SODIUM 5000 UNIT(S): 5000 INJECTION INTRAVENOUS; SUBCUTANEOUS at 16:23

## 2021-07-14 RX ADMIN — Medication 1000 MILLIGRAM(S): at 17:10

## 2021-07-14 RX ADMIN — Medication 100 GRAM(S): at 08:41

## 2021-07-14 NOTE — H&P ADULT - NSHPLABSRESULTS_GEN_ALL_CORE
14.2   13.48 )-----------( 274      ( 2021 18:23 )             43.7     07-13    139  |  100  |  17  ----------------------------<  116<H>  3.9   |  25  |  0.76    Ca    9.9      2021 18:23    TPro  7.9  /  Alb  4.6  /  TBili  0.4  /  DBili  x   /  AST  39  /  ALT  32  /  AlkPhos  97  07-13    LIVER FUNCTIONS - ( 2021 18:23 )  Alb: 4.6 g/dL / Pro: 7.9 g/dL / ALK PHOS: 97 U/L / ALT: 32 U/L / AST: 39 U/L / GGT: x           PT/INR - ( 2021 00:19 )   PT: 11.2 sec;   INR: 0.93          PTT - ( 2021 00:19 )  PTT:26.8 sec  CAPILLARY BLOOD GLUCOSE      POCT Blood Glucose.: 125 mg/dL (2021 19:08)        Urinalysis Basic - ( 2021 00:19 )    Color: Yellow / Appearance: Clear / S.010 / pH: x  Gluc: x / Ketone: NEGATIVE  / Bili: Negative / Urobili: 0.2 E.U./dL   Blood: x / Protein: NEGATIVE mg/dL / Nitrite: NEGATIVE   Leuk Esterase: Trace / RBC: < 5 /HPF / WBC < 5 /HPF   Sq Epi: x / Non Sq Epi: 0-5 /HPF / Bacteria: Present /HPF         EXAM:  CT ABDOMEN AND PELVIS OC IC                          PROCEDURE DATE:  2021          INTERPRETATION:  CT of the ABDOMEN and PELVIS with intravenous contrast dated 2021 9:23 PM    INDICATION: Diffuse abdominal pain, most significantin the right lower quadrant. History of appendectomy, ileocecal valve removal, and cholecystectomy.    TECHNIQUE: CT of the abdomen and pelvis with intravenous and oral contrast. Axial, sagittal, and coronal images were obtained and reviewed. 90 cc of Isovue-370 intravenous contrast was administered; 10 cc was discarded.    COMPARISON: None.    FINDINGS:    Lower chest: Partially visualized subpleural 6 mm solid right middle lobe solid nodule (series 3, image 1).    Liver: Smooth contour. No focal lesion.    Biliary system: Cholecystectomy. Dilated common bile duct measuring 1.1 cm with moderate intrahepatic biliary dilatation. No calcified ductal stone.    Pancreas: Unremarkable.    Spleen: Unremarkable.    Adrenal glands: Unremarkable.    Kidneys: Symmetric parenchymal enhancement. No renal mass. No hydronephrosis. No renal or ureteral stone.    Bowel/Peritoneum: Enteric contrast is seen reaching the mid small bowel. Fluid-filled and mildly dilated reverse C-shaped small bowel loop in the anterior right lower quadrant with proximal and distal transition points located adjacent to each other posterior to this dilated loop adjacent to the ileocolic anastomosis, consistent with closed loop obstruction. Fecalized small bowel proximal tothe closed loop. Mesenteric fluid is associated with the dilated small bowel loops in the right lower quadrant. No pneumatosis or pneumoperitoneum. Distended, contrast-filled stomach. Colonic diverticulosis. Status post appendectomy. Small pelvic andright abdominal ascites.    Pelvic organs: Multiple dilated periuterine veins, greater on the left, nonspecific but can be seen with pelvic congestion syndrome. Uterus is otherwise unremarkable. No adnexal masses. Unremarkable urinary bladder.    Lymph nodes: No lymphadenopathy.    Vascular: Normal caliber aorta. Mild calcified plaque aorta    Bones/Soft tissues: Levocurvature of the thoracolumbar spine. Degenerative changes in the spine.      IMPRESSION:    1.  Closed-loop obstruction in the anterior right lower quadrant adjacent to the ileocolic anastomosis. Discussed with Dr. Bahena at 11:20 PM on 2021.  2.  Intrahepatic and extrahepatic biliary dilatation. No calcified ductal stone. Consider nonemergent MRCP for further evaluation asclinically warranted.    --- End of Report ---          Thank you for the opportunity to participate in the care of this patient.    KATERINA STOCK MD; Resident Radiologist  This document has been electronically signed.  CHASIDY DUNCAN MD; Attending Radiologist  This document has been electronically signed. 2021 11:24PM      CXR for NGT placement pending

## 2021-07-14 NOTE — H&P ADULT - ASSESSMENT
76yo F with no significant PMH and PSH open appendectomy, ileocecectomy for adhesive SBO (on cholestyramine for chronic post-operative diarrhea), R salpingooophorectomy for ectopic pregnancy, cholecystectomy, and L breast lumpectomy for benign tumor, who presented to ED with 1 day of intermittent, crampy abdominal pain associated with N and retching without vomiting and watery diarrhea. WBC elevated (13.48) with left shift, lactate 2.3. CTAP with PO contrast significant for closed-loop obstruction in the anterior right lower quadrant adjacent to the ileocolic anastomosis and incidental intrahepatic and extrahepatic biliary dilatation without choledocholithiasis. Added on for exploratory laparotomy, possible LILLIANA, possible bowel resection in AM.     Admit to Team 4 general surgery, telemetry, Dr. Mendoza   NPO/IVF  1L LR bolus   Pain/Nausea control PRN   Serial abdominal exams   Home meds as appropriate (holding cholestyramine in the setting of SBO)  AM labs  OOBA/SCDs/SQH/IS   Added on for exploratory laparotomy, possible LILLIANA, possible bowel resection in AM (7/730AM)    Plan discussed with attending and chief resident.   ____________________________________________________  JORGE LUIS Minor - Resident   Surgery

## 2021-07-14 NOTE — H&P ADULT - HISTORY OF PRESENT ILLNESS
76yo F with no significant PMH and PSH open appendectomy, ileocecectomy for adhesive SBO (on cholestyramine for chronic post-operative diarrhea), R salpingooophorectomy for ectopic pregnancy, cholecystectomy, and L breast lumpectomy for benign tumor, who presented to ED with 1 day of intermittent, crampy abdominal pain associated with N and retching without vomiting and watery diarrhea. Endorses eating corn yesterday evening and awakening with severe pain today. Denies emesis but endorses some retching and spit up of coffee this afternoon which is why she presented to ED. Describes similar episode of pain, retching and diarrhea about 1.5 weeks ago after a meal of corn and lamb chops which she treated with NPO diet at home. Denies recent travel and sick contacts. Endorses mild CP in ED, EKG performed wnl. Denies SOB, changes in urinary habits, lightheadedness/dizziness.     PMH: chronic diarrhea   PSH: open appendectomy, ileocectomy for adhesive SBO (on cholestyramine for chronic post-operative diarrhea), R salpingoophorectomy for ectopic pregnancy, cholecystectomy, L breast lumpectomy for benign tumor  Meds: Cholestyramine BID   All: NKDA  SHx: +ETOH 2x glasses wine/day, nonsmoker, no illicit drugs, works in real estate, lives in [place] with

## 2021-07-14 NOTE — BRIEF OPERATIVE NOTE - TYPE OF ANESTHESIA
- Likely due to hyperemesis gravidarum.    - On admission:  profound dehydration as noted by her lactic acidosis and elevated anion gap.  She has received 3 L of fluids and her tachycardia has improved and patient has finally been able to provide some urine.    - 12/19: Nauesa and vomiting unchanged. Making adequate urine output but still with poor PO intake. Continue BID Unisom and as needed zofran. Will order CMP and lipase to evaluate for abdominal pain and start on maintenance IVF with mag and K.    General

## 2021-07-14 NOTE — CHART NOTE - NSCHARTNOTEFT_GEN_A_CORE
Serial Abdominal Exam @ 0414    S: Patient c/o minimal abdominal pain, improved from presentation after administration of analgesia. Denies nausea, vomiting, diarrhea, chest pain, dyspnea. NGT in place, causing discomfort.     O: AVSS  Abdomen is soft, mildly distended, tender to deep palpation in RLQ>LLQ, no rebound tenderness or guarding. Surgical scars noted.   NGT 500mL o/p     A/P  Ms. Ayala is a 76yo female with s/sx of small bowel obstruction. Clinically stable, and lateral abdominal exam.     -will continue to monitor  -NGT to LIWS

## 2021-07-14 NOTE — H&P ADULT - NSHPPHYSICALEXAM_GEN_ALL_CORE
GEN: NAD, resting comfortably in bed  CV: NSR  Pulm: no respiratory distress, CTAB   Abd: soft, moderately D, TTP diffusely most significant in RLQ, no rebound or guarding, multiple prior surgical scars well-healed  Ext: WWP, RLE 1+ pitting edema   Neuro: A&Ox3, motor/sensory grossly intact

## 2021-07-14 NOTE — ED ADULT NURSE REASSESSMENT NOTE - NS ED NURSE REASSESS COMMENT FT1
NG tube fr14 was placed by surgery pa, xray done. pt. is alert, awake, pt. tolerated procedure well, comfort measures provided, will monitor.

## 2021-07-14 NOTE — H&P ADULT - NSICDXPASTSURGICALHX_GEN_ALL_CORE_FT
PAST SURGICAL HISTORY:  History of salpingoophorectomy     S/P appendectomy     S/P cholecystectomy     S/P lumpectomy, left breast     S/P small bowel resection ileocecectomy

## 2021-07-14 NOTE — BRIEF OPERATIVE NOTE - OPERATION/FINDINGS
Exploratory laparotomy via midline incision. Single adhesion extending from right fallopian tube to mesentery identified as point of small bowel obstruction. Adhesion lysed using ligasure. Small bowel run from ligament of Treitz to colon without evidence of ischemia or injury. Fascia closed using running PDS. Skin closed with Vicryl and Monocryl.

## 2021-07-15 LAB
ANION GAP SERPL CALC-SCNC: 6 MMOL/L — SIGNIFICANT CHANGE UP (ref 5–17)
BUN SERPL-MCNC: 8 MG/DL — SIGNIFICANT CHANGE UP (ref 7–23)
CALCIUM SERPL-MCNC: 8.5 MG/DL — SIGNIFICANT CHANGE UP (ref 8.4–10.5)
CHLORIDE SERPL-SCNC: 103 MMOL/L — SIGNIFICANT CHANGE UP (ref 96–108)
CO2 SERPL-SCNC: 31 MMOL/L — SIGNIFICANT CHANGE UP (ref 22–31)
COVID-19 SPIKE DOMAIN AB INTERP: POSITIVE
COVID-19 SPIKE DOMAIN ANTIBODY RESULT: >250 U/ML — HIGH
CREAT SERPL-MCNC: 0.68 MG/DL — SIGNIFICANT CHANGE UP (ref 0.5–1.3)
GLUCOSE SERPL-MCNC: 114 MG/DL — HIGH (ref 70–99)
HCT VFR BLD CALC: 40 % — SIGNIFICANT CHANGE UP (ref 34.5–45)
HGB BLD-MCNC: 13 G/DL — SIGNIFICANT CHANGE UP (ref 11.5–15.5)
MAGNESIUM SERPL-MCNC: 1.9 MG/DL — SIGNIFICANT CHANGE UP (ref 1.6–2.6)
MCHC RBC-ENTMCNC: 30.7 PG — SIGNIFICANT CHANGE UP (ref 27–34)
MCHC RBC-ENTMCNC: 32.5 GM/DL — SIGNIFICANT CHANGE UP (ref 32–36)
MCV RBC AUTO: 94.3 FL — SIGNIFICANT CHANGE UP (ref 80–100)
NRBC # BLD: 0 /100 WBCS — SIGNIFICANT CHANGE UP (ref 0–0)
PHOSPHATE SERPL-MCNC: 3.5 MG/DL — SIGNIFICANT CHANGE UP (ref 2.5–4.5)
PLATELET # BLD AUTO: 246 K/UL — SIGNIFICANT CHANGE UP (ref 150–400)
POTASSIUM SERPL-MCNC: 4.3 MMOL/L — SIGNIFICANT CHANGE UP (ref 3.5–5.3)
POTASSIUM SERPL-SCNC: 4.3 MMOL/L — SIGNIFICANT CHANGE UP (ref 3.5–5.3)
RBC # BLD: 4.24 M/UL — SIGNIFICANT CHANGE UP (ref 3.8–5.2)
RBC # FLD: 13.2 % — SIGNIFICANT CHANGE UP (ref 10.3–14.5)
SARS-COV-2 IGG+IGM SERPL QL IA: >250 U/ML — HIGH
SARS-COV-2 IGG+IGM SERPL QL IA: POSITIVE
SODIUM SERPL-SCNC: 140 MMOL/L — SIGNIFICANT CHANGE UP (ref 135–145)
WBC # BLD: 8.65 K/UL — SIGNIFICANT CHANGE UP (ref 3.8–10.5)
WBC # FLD AUTO: 8.65 K/UL — SIGNIFICANT CHANGE UP (ref 3.8–10.5)

## 2021-07-15 RX ORDER — ACETAMINOPHEN 500 MG
1000 TABLET ORAL ONCE
Refills: 0 | Status: COMPLETED | OUTPATIENT
Start: 2021-07-15 | End: 2021-07-15

## 2021-07-15 RX ORDER — ACETAMINOPHEN 500 MG
1000 TABLET ORAL ONCE
Refills: 0 | Status: COMPLETED | OUTPATIENT
Start: 2021-07-16 | End: 2021-07-16

## 2021-07-15 RX ORDER — MAGNESIUM SULFATE 500 MG/ML
1 VIAL (ML) INJECTION ONCE
Refills: 0 | Status: COMPLETED | OUTPATIENT
Start: 2021-07-15 | End: 2021-07-15

## 2021-07-15 RX ORDER — KETOROLAC TROMETHAMINE 30 MG/ML
15 SYRINGE (ML) INJECTION EVERY 6 HOURS
Refills: 0 | Status: DISCONTINUED | OUTPATIENT
Start: 2021-07-15 | End: 2021-07-18

## 2021-07-15 RX ADMIN — HYDROMORPHONE HYDROCHLORIDE 0.5 MILLIGRAM(S): 2 INJECTION INTRAMUSCULAR; INTRAVENOUS; SUBCUTANEOUS at 07:28

## 2021-07-15 RX ADMIN — BENZOCAINE AND MENTHOL 1 LOZENGE: 5; 1 LIQUID ORAL at 07:32

## 2021-07-15 RX ADMIN — Medication 400 MILLIGRAM(S): at 22:03

## 2021-07-15 RX ADMIN — HEPARIN SODIUM 5000 UNIT(S): 5000 INJECTION INTRAVENOUS; SUBCUTANEOUS at 14:15

## 2021-07-15 RX ADMIN — Medication 400 MILLIGRAM(S): at 16:04

## 2021-07-15 RX ADMIN — Medication 1000 MILLIGRAM(S): at 11:01

## 2021-07-15 RX ADMIN — Medication 400 MILLIGRAM(S): at 10:46

## 2021-07-15 RX ADMIN — SODIUM CHLORIDE 100 MILLILITER(S): 9 INJECTION, SOLUTION INTRAVENOUS at 16:04

## 2021-07-15 RX ADMIN — Medication 15 MILLIGRAM(S): at 17:53

## 2021-07-15 RX ADMIN — Medication 15 MILLIGRAM(S): at 18:08

## 2021-07-15 RX ADMIN — HYDROMORPHONE HYDROCHLORIDE 0.5 MILLIGRAM(S): 2 INJECTION INTRAMUSCULAR; INTRAVENOUS; SUBCUTANEOUS at 08:05

## 2021-07-15 RX ADMIN — HEPARIN SODIUM 5000 UNIT(S): 5000 INJECTION INTRAVENOUS; SUBCUTANEOUS at 22:03

## 2021-07-15 RX ADMIN — Medication 1000 MILLIGRAM(S): at 05:13

## 2021-07-15 RX ADMIN — Medication 1000 MILLIGRAM(S): at 16:19

## 2021-07-15 RX ADMIN — Medication 1000 MILLIGRAM(S): at 22:20

## 2021-07-15 RX ADMIN — Medication 400 MILLIGRAM(S): at 04:34

## 2021-07-15 RX ADMIN — Medication 15 MILLIGRAM(S): at 23:46

## 2021-07-15 RX ADMIN — Medication 15 MILLIGRAM(S): at 12:07

## 2021-07-15 RX ADMIN — Medication 100 GRAM(S): at 07:47

## 2021-07-15 RX ADMIN — Medication 15 MILLIGRAM(S): at 12:22

## 2021-07-15 RX ADMIN — HEPARIN SODIUM 5000 UNIT(S): 5000 INJECTION INTRAVENOUS; SUBCUTANEOUS at 06:55

## 2021-07-16 LAB
ANION GAP SERPL CALC-SCNC: 11 MMOL/L — SIGNIFICANT CHANGE UP (ref 5–17)
BUN SERPL-MCNC: 13 MG/DL — SIGNIFICANT CHANGE UP (ref 7–23)
CALCIUM SERPL-MCNC: 8.1 MG/DL — LOW (ref 8.4–10.5)
CHLORIDE SERPL-SCNC: 104 MMOL/L — SIGNIFICANT CHANGE UP (ref 96–108)
CO2 SERPL-SCNC: 29 MMOL/L — SIGNIFICANT CHANGE UP (ref 22–31)
CREAT SERPL-MCNC: 0.61 MG/DL — SIGNIFICANT CHANGE UP (ref 0.5–1.3)
GLUCOSE SERPL-MCNC: 85 MG/DL — SIGNIFICANT CHANGE UP (ref 70–99)
HCT VFR BLD CALC: 33.9 % — LOW (ref 34.5–45)
HGB BLD-MCNC: 10.9 G/DL — LOW (ref 11.5–15.5)
MAGNESIUM SERPL-MCNC: 2 MG/DL — SIGNIFICANT CHANGE UP (ref 1.6–2.6)
MCHC RBC-ENTMCNC: 30.4 PG — SIGNIFICANT CHANGE UP (ref 27–34)
MCHC RBC-ENTMCNC: 32.2 GM/DL — SIGNIFICANT CHANGE UP (ref 32–36)
MCV RBC AUTO: 94.7 FL — SIGNIFICANT CHANGE UP (ref 80–100)
NRBC # BLD: 0 /100 WBCS — SIGNIFICANT CHANGE UP (ref 0–0)
PHOSPHATE SERPL-MCNC: 2.7 MG/DL — SIGNIFICANT CHANGE UP (ref 2.5–4.5)
PLATELET # BLD AUTO: 205 K/UL — SIGNIFICANT CHANGE UP (ref 150–400)
POTASSIUM SERPL-MCNC: 3.6 MMOL/L — SIGNIFICANT CHANGE UP (ref 3.5–5.3)
POTASSIUM SERPL-SCNC: 3.6 MMOL/L — SIGNIFICANT CHANGE UP (ref 3.5–5.3)
RBC # BLD: 3.58 M/UL — LOW (ref 3.8–5.2)
RBC # FLD: 13.2 % — SIGNIFICANT CHANGE UP (ref 10.3–14.5)
SODIUM SERPL-SCNC: 144 MMOL/L — SIGNIFICANT CHANGE UP (ref 135–145)
WBC # BLD: 7.46 K/UL — SIGNIFICANT CHANGE UP (ref 3.8–10.5)
WBC # FLD AUTO: 7.46 K/UL — SIGNIFICANT CHANGE UP (ref 3.8–10.5)

## 2021-07-16 RX ORDER — ACETAMINOPHEN 500 MG
1000 TABLET ORAL ONCE
Refills: 0 | Status: COMPLETED | OUTPATIENT
Start: 2021-07-16 | End: 2021-07-16

## 2021-07-16 RX ORDER — POTASSIUM CHLORIDE 20 MEQ
10 PACKET (EA) ORAL
Refills: 0 | Status: COMPLETED | OUTPATIENT
Start: 2021-07-16 | End: 2021-07-16

## 2021-07-16 RX ADMIN — Medication 400 MILLIGRAM(S): at 10:31

## 2021-07-16 RX ADMIN — Medication 10 MILLIGRAM(S): at 08:43

## 2021-07-16 RX ADMIN — Medication 15 MILLIGRAM(S): at 17:24

## 2021-07-16 RX ADMIN — Medication 100 MILLIEQUIVALENT(S): at 12:13

## 2021-07-16 RX ADMIN — Medication 15 MILLIGRAM(S): at 11:28

## 2021-07-16 RX ADMIN — Medication 15 MILLIGRAM(S): at 00:06

## 2021-07-16 RX ADMIN — Medication 15 MILLIGRAM(S): at 17:55

## 2021-07-16 RX ADMIN — SODIUM CHLORIDE 90 MILLILITER(S): 9 INJECTION, SOLUTION INTRAVENOUS at 14:03

## 2021-07-16 RX ADMIN — Medication 15 MILLIGRAM(S): at 11:13

## 2021-07-16 RX ADMIN — HEPARIN SODIUM 5000 UNIT(S): 5000 INJECTION INTRAVENOUS; SUBCUTANEOUS at 06:00

## 2021-07-16 RX ADMIN — Medication 1000 MILLIGRAM(S): at 10:46

## 2021-07-16 RX ADMIN — HEPARIN SODIUM 5000 UNIT(S): 5000 INJECTION INTRAVENOUS; SUBCUTANEOUS at 14:04

## 2021-07-16 RX ADMIN — Medication 100 MILLIEQUIVALENT(S): at 10:32

## 2021-07-16 RX ADMIN — Medication 400 MILLIGRAM(S): at 04:04

## 2021-07-16 RX ADMIN — Medication 100 MILLIEQUIVALENT(S): at 09:22

## 2021-07-16 RX ADMIN — HEPARIN SODIUM 5000 UNIT(S): 5000 INJECTION INTRAVENOUS; SUBCUTANEOUS at 21:37

## 2021-07-16 RX ADMIN — Medication 15 MILLIGRAM(S): at 06:00

## 2021-07-16 NOTE — PROVIDER CONTACT NOTE (OTHER) - ASSESSMENT
Patient denies chest pain or palpitations,Vs taken,bp 145/75, pulse 77bpm,oxygen saturation 95% on room air,and temperature 97.7F

## 2021-07-16 NOTE — PROVIDER CONTACT NOTE (OTHER) - SITUATION
Patient is alert and oriented x4,stated she has history of arrythmia but unable to state what type, stated she felt she was having a heart arrythmia and called her own cardiologist.
Pt verbalizing she experienced an irregular heartbeat, saying it was an "arrythmia." Pt was just transferred from [address] [place] [address].

## 2021-07-16 NOTE — CHART NOTE - NSCHARTNOTEFT_GEN_A_CORE
S: Called to bedside by RN for patient with c/o swelling in the hands. Patient evaluated and describes tightness in the skin of the hands, equal b/l only noticing several minutes PTA. Denies headache, chest pain, dyspnea, swelling of the tongue/lips. C/o NGT discomfort. No additional medications ordered since the onset.    O: AVSS, Respiration is non-labored. Pulse is RRR, Skin color as with previous exams. No tenting of skin, Cap refill <3sec. Rings in place with distal CSMT in tact. Lower extremities without edema. Full range of motion in DIP, PIP, MCP joints. Smile is symmetric, 5+ strength in upper extremities b/l.      A/P:  - evaluated fluid status   - lower fluid to 90/hr  - will continue to monitor

## 2021-07-16 NOTE — PROVIDER CONTACT NOTE (OTHER) - BACKGROUND
Pt had just arrived from [address] [place] [address], but before she was transferred today, she had complained of experiencing an arrhythmia while on [facility], where an EKG was performed and was deemed normal.

## 2021-07-16 NOTE — PROVIDER CONTACT NOTE (OTHER) - ACTION/TREATMENT ORDERED:
Dr.Ehsan Carmona notified of patient's complaints and assessed patient  at bedside and stated to do 12 lead EKG and done.
Eleni made aware, no interventions at this time.

## 2021-07-16 NOTE — PROVIDER CONTACT NOTE (OTHER) - ASSESSMENT
Pt saying it was a passing arrhythmia which she is no longer experiencing. Vital signs 97.8F, 74 HR, 134/74 BP, 16 RR and 96% on RA. Pt had NG tube removed by team.

## 2021-07-17 LAB
ANION GAP SERPL CALC-SCNC: 14 MMOL/L — SIGNIFICANT CHANGE UP (ref 5–17)
BUN SERPL-MCNC: 10 MG/DL — SIGNIFICANT CHANGE UP (ref 7–23)
CALCIUM SERPL-MCNC: 8.4 MG/DL — SIGNIFICANT CHANGE UP (ref 8.4–10.5)
CHLORIDE SERPL-SCNC: 101 MMOL/L — SIGNIFICANT CHANGE UP (ref 96–108)
CO2 SERPL-SCNC: 25 MMOL/L — SIGNIFICANT CHANGE UP (ref 22–31)
CREAT SERPL-MCNC: 0.62 MG/DL — SIGNIFICANT CHANGE UP (ref 0.5–1.3)
GLUCOSE SERPL-MCNC: 78 MG/DL — SIGNIFICANT CHANGE UP (ref 70–99)
HCT VFR BLD CALC: 32.5 % — LOW (ref 34.5–45)
HGB BLD-MCNC: 10.4 G/DL — LOW (ref 11.5–15.5)
MAGNESIUM SERPL-MCNC: 1.8 MG/DL — SIGNIFICANT CHANGE UP (ref 1.6–2.6)
MCHC RBC-ENTMCNC: 30.2 PG — SIGNIFICANT CHANGE UP (ref 27–34)
MCHC RBC-ENTMCNC: 32 GM/DL — SIGNIFICANT CHANGE UP (ref 32–36)
MCV RBC AUTO: 94.5 FL — SIGNIFICANT CHANGE UP (ref 80–100)
NRBC # BLD: 0 /100 WBCS — SIGNIFICANT CHANGE UP (ref 0–0)
PHOSPHATE SERPL-MCNC: 2.8 MG/DL — SIGNIFICANT CHANGE UP (ref 2.5–4.5)
PLATELET # BLD AUTO: 215 K/UL — SIGNIFICANT CHANGE UP (ref 150–400)
POTASSIUM SERPL-MCNC: 3.7 MMOL/L — SIGNIFICANT CHANGE UP (ref 3.5–5.3)
POTASSIUM SERPL-SCNC: 3.7 MMOL/L — SIGNIFICANT CHANGE UP (ref 3.5–5.3)
RBC # BLD: 3.44 M/UL — LOW (ref 3.8–5.2)
RBC # FLD: 13.2 % — SIGNIFICANT CHANGE UP (ref 10.3–14.5)
SODIUM SERPL-SCNC: 140 MMOL/L — SIGNIFICANT CHANGE UP (ref 135–145)
WBC # BLD: 7.71 K/UL — SIGNIFICANT CHANGE UP (ref 3.8–10.5)
WBC # FLD AUTO: 7.71 K/UL — SIGNIFICANT CHANGE UP (ref 3.8–10.5)

## 2021-07-17 RX ORDER — OXYCODONE AND ACETAMINOPHEN 5; 325 MG/1; MG/1
2 TABLET ORAL EVERY 4 HOURS
Refills: 0 | Status: DISCONTINUED | OUTPATIENT
Start: 2021-07-17 | End: 2021-07-17

## 2021-07-17 RX ORDER — MAGNESIUM SULFATE 500 MG/ML
1 VIAL (ML) INJECTION ONCE
Refills: 0 | Status: COMPLETED | OUTPATIENT
Start: 2021-07-17 | End: 2021-07-17

## 2021-07-17 RX ORDER — OXYCODONE HYDROCHLORIDE 5 MG/1
5 TABLET ORAL EVERY 6 HOURS
Refills: 0 | Status: DISCONTINUED | OUTPATIENT
Start: 2021-07-17 | End: 2021-07-19

## 2021-07-17 RX ORDER — ACETAMINOPHEN 500 MG
650 TABLET ORAL EVERY 6 HOURS
Refills: 0 | Status: DISCONTINUED | OUTPATIENT
Start: 2021-07-17 | End: 2021-07-19

## 2021-07-17 RX ORDER — ACETAMINOPHEN 500 MG
650 TABLET ORAL EVERY 6 HOURS
Refills: 0 | Status: DISCONTINUED | OUTPATIENT
Start: 2021-07-17 | End: 2021-07-17

## 2021-07-17 RX ORDER — OXYCODONE AND ACETAMINOPHEN 5; 325 MG/1; MG/1
1 TABLET ORAL EVERY 4 HOURS
Refills: 0 | Status: DISCONTINUED | OUTPATIENT
Start: 2021-07-17 | End: 2021-07-17

## 2021-07-17 RX ORDER — POTASSIUM CHLORIDE 20 MEQ
20 PACKET (EA) ORAL ONCE
Refills: 0 | Status: COMPLETED | OUTPATIENT
Start: 2021-07-17 | End: 2021-07-17

## 2021-07-17 RX ADMIN — Medication 15 MILLIGRAM(S): at 18:04

## 2021-07-17 RX ADMIN — Medication 15 MILLIGRAM(S): at 11:45

## 2021-07-17 RX ADMIN — Medication 15 MILLIGRAM(S): at 06:16

## 2021-07-17 RX ADMIN — Medication 15 MILLIGRAM(S): at 00:01

## 2021-07-17 RX ADMIN — Medication 15 MILLIGRAM(S): at 23:15

## 2021-07-17 RX ADMIN — Medication 15 MILLIGRAM(S): at 11:29

## 2021-07-17 RX ADMIN — HEPARIN SODIUM 5000 UNIT(S): 5000 INJECTION INTRAVENOUS; SUBCUTANEOUS at 06:16

## 2021-07-17 RX ADMIN — Medication 650 MILLIGRAM(S): at 10:30

## 2021-07-17 RX ADMIN — Medication 15 MILLIGRAM(S): at 00:30

## 2021-07-17 RX ADMIN — Medication 15 MILLIGRAM(S): at 07:00

## 2021-07-17 RX ADMIN — Medication 20 MILLIEQUIVALENT(S): at 18:47

## 2021-07-17 RX ADMIN — Medication 15 MILLIGRAM(S): at 23:30

## 2021-07-17 RX ADMIN — Medication 650 MILLIGRAM(S): at 09:28

## 2021-07-17 RX ADMIN — HEPARIN SODIUM 5000 UNIT(S): 5000 INJECTION INTRAVENOUS; SUBCUTANEOUS at 21:35

## 2021-07-17 RX ADMIN — HEPARIN SODIUM 5000 UNIT(S): 5000 INJECTION INTRAVENOUS; SUBCUTANEOUS at 14:53

## 2021-07-17 RX ADMIN — Medication 100 GRAM(S): at 18:49

## 2021-07-18 LAB
ANION GAP SERPL CALC-SCNC: 14 MMOL/L — SIGNIFICANT CHANGE UP (ref 5–17)
BUN SERPL-MCNC: 8 MG/DL — SIGNIFICANT CHANGE UP (ref 7–23)
CALCIUM SERPL-MCNC: 8.3 MG/DL — LOW (ref 8.4–10.5)
CHLORIDE SERPL-SCNC: 103 MMOL/L — SIGNIFICANT CHANGE UP (ref 96–108)
CO2 SERPL-SCNC: 24 MMOL/L — SIGNIFICANT CHANGE UP (ref 22–31)
CREAT SERPL-MCNC: 0.58 MG/DL — SIGNIFICANT CHANGE UP (ref 0.5–1.3)
GLUCOSE SERPL-MCNC: 89 MG/DL — SIGNIFICANT CHANGE UP (ref 70–99)
HCT VFR BLD CALC: 31 % — LOW (ref 34.5–45)
HCT VFR BLD CALC: 31.7 % — LOW (ref 34.5–45)
HGB BLD-MCNC: 10.1 G/DL — LOW (ref 11.5–15.5)
HGB BLD-MCNC: 10.1 G/DL — LOW (ref 11.5–15.5)
MAGNESIUM SERPL-MCNC: 1.9 MG/DL — SIGNIFICANT CHANGE UP (ref 1.6–2.6)
MCHC RBC-ENTMCNC: 30.3 PG — SIGNIFICANT CHANGE UP (ref 27–34)
MCHC RBC-ENTMCNC: 30.4 PG — SIGNIFICANT CHANGE UP (ref 27–34)
MCHC RBC-ENTMCNC: 31.9 GM/DL — LOW (ref 32–36)
MCHC RBC-ENTMCNC: 32.6 GM/DL — SIGNIFICANT CHANGE UP (ref 32–36)
MCV RBC AUTO: 93.1 FL — SIGNIFICANT CHANGE UP (ref 80–100)
MCV RBC AUTO: 95.5 FL — SIGNIFICANT CHANGE UP (ref 80–100)
NRBC # BLD: 0 /100 WBCS — SIGNIFICANT CHANGE UP (ref 0–0)
NRBC # BLD: 0 /100 WBCS — SIGNIFICANT CHANGE UP (ref 0–0)
PHOSPHATE SERPL-MCNC: 3 MG/DL — SIGNIFICANT CHANGE UP (ref 2.5–4.5)
PLATELET # BLD AUTO: 218 K/UL — SIGNIFICANT CHANGE UP (ref 150–400)
PLATELET # BLD AUTO: 231 K/UL — SIGNIFICANT CHANGE UP (ref 150–400)
POTASSIUM SERPL-MCNC: 3.7 MMOL/L — SIGNIFICANT CHANGE UP (ref 3.5–5.3)
POTASSIUM SERPL-SCNC: 3.7 MMOL/L — SIGNIFICANT CHANGE UP (ref 3.5–5.3)
RBC # BLD: 3.32 M/UL — LOW (ref 3.8–5.2)
RBC # BLD: 3.33 M/UL — LOW (ref 3.8–5.2)
RBC # FLD: 13.1 % — SIGNIFICANT CHANGE UP (ref 10.3–14.5)
RBC # FLD: 13.1 % — SIGNIFICANT CHANGE UP (ref 10.3–14.5)
SODIUM SERPL-SCNC: 141 MMOL/L — SIGNIFICANT CHANGE UP (ref 135–145)
WBC # BLD: 6.27 K/UL — SIGNIFICANT CHANGE UP (ref 3.8–10.5)
WBC # BLD: 6.53 K/UL — SIGNIFICANT CHANGE UP (ref 3.8–10.5)
WBC # FLD AUTO: 6.27 K/UL — SIGNIFICANT CHANGE UP (ref 3.8–10.5)
WBC # FLD AUTO: 6.53 K/UL — SIGNIFICANT CHANGE UP (ref 3.8–10.5)

## 2021-07-18 RX ORDER — POTASSIUM CHLORIDE 20 MEQ
20 PACKET (EA) ORAL ONCE
Refills: 0 | Status: COMPLETED | OUTPATIENT
Start: 2021-07-18 | End: 2021-07-18

## 2021-07-18 RX ADMIN — HEPARIN SODIUM 5000 UNIT(S): 5000 INJECTION INTRAVENOUS; SUBCUTANEOUS at 14:50

## 2021-07-18 RX ADMIN — Medication 15 MILLIGRAM(S): at 05:30

## 2021-07-18 RX ADMIN — Medication 15 MILLIGRAM(S): at 05:15

## 2021-07-18 RX ADMIN — Medication 650 MILLIGRAM(S): at 22:10

## 2021-07-18 RX ADMIN — HEPARIN SODIUM 5000 UNIT(S): 5000 INJECTION INTRAVENOUS; SUBCUTANEOUS at 05:10

## 2021-07-18 RX ADMIN — HEPARIN SODIUM 5000 UNIT(S): 5000 INJECTION INTRAVENOUS; SUBCUTANEOUS at 21:44

## 2021-07-18 RX ADMIN — Medication 650 MILLIGRAM(S): at 15:50

## 2021-07-18 RX ADMIN — Medication 650 MILLIGRAM(S): at 21:44

## 2021-07-18 RX ADMIN — Medication 650 MILLIGRAM(S): at 14:48

## 2021-07-18 RX ADMIN — Medication 20 MILLIEQUIVALENT(S): at 17:40

## 2021-07-19 ENCOUNTER — TRANSCRIPTION ENCOUNTER (OUTPATIENT)
Age: 76
End: 2021-07-19

## 2021-07-19 VITALS
RESPIRATION RATE: 16 BRPM | SYSTOLIC BLOOD PRESSURE: 145 MMHG | TEMPERATURE: 98 F | OXYGEN SATURATION: 95 % | HEART RATE: 76 BPM | DIASTOLIC BLOOD PRESSURE: 83 MMHG

## 2021-07-19 LAB
ALBUMIN SERPL ELPH-MCNC: 3 G/DL — LOW (ref 3.3–5)
ALP SERPL-CCNC: 156 U/L — HIGH (ref 40–120)
ALT FLD-CCNC: 122 U/L — HIGH (ref 10–45)
ANION GAP SERPL CALC-SCNC: 9 MMOL/L — SIGNIFICANT CHANGE UP (ref 5–17)
AST SERPL-CCNC: 32 U/L — SIGNIFICANT CHANGE UP (ref 10–40)
BILIRUB SERPL-MCNC: 0.4 MG/DL — SIGNIFICANT CHANGE UP (ref 0.2–1.2)
BUN SERPL-MCNC: 7 MG/DL — SIGNIFICANT CHANGE UP (ref 7–23)
CALCIUM SERPL-MCNC: 8.6 MG/DL — SIGNIFICANT CHANGE UP (ref 8.4–10.5)
CHLORIDE SERPL-SCNC: 105 MMOL/L — SIGNIFICANT CHANGE UP (ref 96–108)
CO2 SERPL-SCNC: 27 MMOL/L — SIGNIFICANT CHANGE UP (ref 22–31)
CREAT SERPL-MCNC: 0.62 MG/DL — SIGNIFICANT CHANGE UP (ref 0.5–1.3)
GLUCOSE SERPL-MCNC: 113 MG/DL — HIGH (ref 70–99)
HCT VFR BLD CALC: 30.8 % — LOW (ref 34.5–45)
HGB BLD-MCNC: 9.9 G/DL — LOW (ref 11.5–15.5)
MAGNESIUM SERPL-MCNC: 1.7 MG/DL — SIGNIFICANT CHANGE UP (ref 1.6–2.6)
MCHC RBC-ENTMCNC: 30.5 PG — SIGNIFICANT CHANGE UP (ref 27–34)
MCHC RBC-ENTMCNC: 32.1 GM/DL — SIGNIFICANT CHANGE UP (ref 32–36)
MCV RBC AUTO: 94.8 FL — SIGNIFICANT CHANGE UP (ref 80–100)
NRBC # BLD: 0 /100 WBCS — SIGNIFICANT CHANGE UP (ref 0–0)
PHOSPHATE SERPL-MCNC: 3.4 MG/DL — SIGNIFICANT CHANGE UP (ref 2.5–4.5)
PLATELET # BLD AUTO: 224 K/UL — SIGNIFICANT CHANGE UP (ref 150–400)
POTASSIUM SERPL-MCNC: 3.7 MMOL/L — SIGNIFICANT CHANGE UP (ref 3.5–5.3)
POTASSIUM SERPL-SCNC: 3.7 MMOL/L — SIGNIFICANT CHANGE UP (ref 3.5–5.3)
PROT SERPL-MCNC: 5.4 G/DL — LOW (ref 6–8.3)
RBC # BLD: 3.25 M/UL — LOW (ref 3.8–5.2)
RBC # FLD: 13.2 % — SIGNIFICANT CHANGE UP (ref 10.3–14.5)
SODIUM SERPL-SCNC: 141 MMOL/L — SIGNIFICANT CHANGE UP (ref 135–145)
WBC # BLD: 5.4 K/UL — SIGNIFICANT CHANGE UP (ref 3.8–10.5)
WBC # FLD AUTO: 5.4 K/UL — SIGNIFICANT CHANGE UP (ref 3.8–10.5)

## 2021-07-19 PROCEDURE — 85730 THROMBOPLASTIN TIME PARTIAL: CPT

## 2021-07-19 PROCEDURE — 83690 ASSAY OF LIPASE: CPT

## 2021-07-19 PROCEDURE — 80053 COMPREHEN METABOLIC PANEL: CPT

## 2021-07-19 PROCEDURE — 81001 URINALYSIS AUTO W/SCOPE: CPT

## 2021-07-19 PROCEDURE — 86901 BLOOD TYPING SEROLOGIC RH(D): CPT

## 2021-07-19 PROCEDURE — 87635 SARS-COV-2 COVID-19 AMP PRB: CPT

## 2021-07-19 PROCEDURE — 99285 EMERGENCY DEPT VISIT HI MDM: CPT

## 2021-07-19 PROCEDURE — 74177 CT ABD & PELVIS W/CONTRAST: CPT | Mod: MA

## 2021-07-19 PROCEDURE — 36415 COLL VENOUS BLD VENIPUNCTURE: CPT

## 2021-07-19 PROCEDURE — 86850 RBC ANTIBODY SCREEN: CPT

## 2021-07-19 PROCEDURE — 88304 TISSUE EXAM BY PATHOLOGIST: CPT

## 2021-07-19 PROCEDURE — 85025 COMPLETE CBC W/AUTO DIFF WBC: CPT

## 2021-07-19 PROCEDURE — 86769 SARS-COV-2 COVID-19 ANTIBODY: CPT

## 2021-07-19 PROCEDURE — 80048 BASIC METABOLIC PNL TOTAL CA: CPT

## 2021-07-19 PROCEDURE — 83605 ASSAY OF LACTIC ACID: CPT

## 2021-07-19 PROCEDURE — 71045 X-RAY EXAM CHEST 1 VIEW: CPT

## 2021-07-19 PROCEDURE — 85610 PROTHROMBIN TIME: CPT

## 2021-07-19 PROCEDURE — 84100 ASSAY OF PHOSPHORUS: CPT

## 2021-07-19 PROCEDURE — 85027 COMPLETE CBC AUTOMATED: CPT

## 2021-07-19 PROCEDURE — 86900 BLOOD TYPING SEROLOGIC ABO: CPT

## 2021-07-19 PROCEDURE — 82962 GLUCOSE BLOOD TEST: CPT

## 2021-07-19 PROCEDURE — 83735 ASSAY OF MAGNESIUM: CPT

## 2021-07-19 RX ORDER — MAGNESIUM SULFATE 500 MG/ML
1 VIAL (ML) INJECTION ONCE
Refills: 0 | Status: COMPLETED | OUTPATIENT
Start: 2021-07-19 | End: 2021-07-19

## 2021-07-19 RX ORDER — POTASSIUM CHLORIDE 20 MEQ
20 PACKET (EA) ORAL ONCE
Refills: 0 | Status: COMPLETED | OUTPATIENT
Start: 2021-07-19 | End: 2021-07-19

## 2021-07-19 RX ORDER — ACETAMINOPHEN 500 MG
2 TABLET ORAL
Qty: 0 | Refills: 0 | DISCHARGE
Start: 2021-07-19

## 2021-07-19 RX ADMIN — Medication 650 MILLIGRAM(S): at 06:06

## 2021-07-19 RX ADMIN — HEPARIN SODIUM 5000 UNIT(S): 5000 INJECTION INTRAVENOUS; SUBCUTANEOUS at 05:23

## 2021-07-19 RX ADMIN — Medication 100 GRAM(S): at 09:56

## 2021-07-19 RX ADMIN — Medication 20 MILLIEQUIVALENT(S): at 09:56

## 2021-07-19 RX ADMIN — Medication 650 MILLIGRAM(S): at 05:23

## 2021-07-19 NOTE — DISCHARGE NOTE PROVIDER - NSDCCPTREATMENT_GEN_ALL_CORE_FT
PRINCIPAL PROCEDURE  Procedure: Exploratory laparotomy  Findings and Treatment:       SECONDARY PROCEDURE  Procedure: Enterolysis for small bowel obstruction  Findings and Treatment:

## 2021-07-19 NOTE — PROGRESS NOTE ADULT - SUBJECTIVE AND OBJECTIVE BOX
24 hr events:  ON: passing flatus. c/o swelling in the hands.   7/15; -F/-BM, OOBA, NGT output 100    SUBJECTIVE:  Pt seen and examined by chief resident. Pt is doing well, resting comfortably on bed. Pain controlled. Ambulating out of bed. +F/-BM. No nausea or vomiting. No complaints at this time.    Vital Signs Last 24 Hrs  T(C): 36.4 (16 Jul 2021 08:46), Max: 36.7 (15 Jul 2021 21:01)  T(F): 97.6 (16 Jul 2021 08:46), Max: 98.1 (15 Jul 2021 21:01)  HR: 73 (16 Jul 2021 08:46) (70 - 76)  BP: 145/74 (16 Jul 2021 08:46) (120/62 - 153/64)  BP(mean): --  RR: 16 (16 Jul 2021 08:46) (16 - 18)  SpO2: 94% (16 Jul 2021 08:46) (92% - 94%)    Physical Exam:  General: NAD  Pulmonary: Nonlabored breathing, no respiratory distress  Abdominal: soft, nontender, mild distention, incision clean dry and intact.   Extremities: WWP, SCDs in place    I&O's Summary    15 Jul 2021 07:01  -  16 Jul 2021 07:00  --------------------------------------------------------  IN: 2290 mL / OUT: 1625 mL / NET: 665 mL        LABS:                        10.9   7.46  )-----------( 205      ( 16 Jul 2021 08:10 )             33.9     07-16    144  |  104  |  13  ----------------------------<  85  3.6   |  29  |  0.61    Ca    8.1<L>      16 Jul 2021 08:10  Phos  2.7     07-16  Mg     2.0     07-16      
[facility] Surgery Post-Op Note, PCN:     Pre-Op Dx: closed loop bowel obstruction  Procedure: Enterolysis for small bowel obstruction      Surgeon: Reji    Subjective: Pt seen and examined at bedside 2 hours post-op. Pt is doing well, resting in bed. Pt reporting abdominal pain. Pt denies CP, SOB, nausea, vomiting, leg pain/cramping.    Vital Signs Last 24 Hrs  T(C): 36.8 (2021 16:00), Max: 36.8 (2021 16:00)  T(F): 98.2 (2021 16:00), Max: 98.2 (2021 16:00)  HR: 81 (2021 16:00) (57 - 89)  BP: 123/68 (2021 16:00) (105/55 - 149/83)  BP(mean): 89 (2021 16:00) (83 - 92)  RR: 14 (2021 16:00) (14 - 18)  SpO2: 97% (2021 16:00) (95% - 100%)    Physical Exam:  General: NAD, resting comfortably in bed  Pulmonary: Nonlabored breathing, no respiratory distress  Cardiovascular: NSR  Abdominal: soft, NT/ND, midline incision clean dry and intact  Extremities: WWP, normal strength  : powell to gravity with yellow clear urine      LABS:                        12.3   6.30  )-----------( 156      ( 2021 05:47 )             38.5     07-14    139  |  103  |  12  ----------------------------<  124<H>  4.2   |  26  |  0.65    Ca    9.0      2021 05:47  Phos  3.5     07-14  Mg     1.8     07-14    TPro  7.9  /  Alb  4.6  /  TBili  0.4  /  DBili  x   /  AST  39  /  ALT  32  /  AlkPhos  97  07-13    PT/INR - ( 2021 00:19 )   PT: 11.2 sec;   INR: 0.93       PTT - ( 2021 00:19 )  PTT:26.8 sec    CAPILLARY BLOOD GLUCOSE  POCT Blood Glucose.: 125 mg/dL (2021 19:08)    Urinalysis Basic - ( 2021 00:19 )  Color: Yellow / Appearance: Clear / S.010 / pH: x  Gluc: x / Ketone: NEGATIVE  / Bili: Negative / Urobili: 0.2 E.U./dL   Blood: x / Protein: NEGATIVE mg/dL / Nitrite: NEGATIVE   Leuk Esterase: Trace / RBC: < 5 /HPF / WBC < 5 /HPF   Sq Epi: x / Non Sq Epi: 0-5 /HPF / Bacteria: Present /HPF      LIVER FUNCTIONS - ( 2021 18:23 )  Alb: 4.6 g/dL / Pro: 7.9 g/dL / ALK PHOS: 97 U/L / ALT: 32 U/L / AST: 39 U/L / GGT: x           ABO Interpretation: O ( @ 06:02)    
SUBJECTIVE: POD 1 s/p ex lap and LILLIANA. Pt examined at bedside this morning, no events overnight. Patient is feeling well this morning, pain is well controlled. Denies bm/f, No n/v, fever, CP, SOB. NG in place.      MEDICATIONS  (STANDING):  BUpivacaine liposome 1.3% Injectable (no eMAR) 20 milliLiter(s) Local Injection once  heparin   Injectable 5000 Unit(s) SubCutaneous every 8 hours  ketorolac   Injectable 15 milliGRAM(s) IV Push every 6 hours  lactated ringers. 1000 milliLiter(s) (100 mL/Hr) IV Continuous <Continuous>  magnesium sulfate  IVPB 1 Gram(s) IV Intermittent once    MEDICATIONS  (PRN):  benzocaine 15 mG/menthol 3.6 mG Lozenge 1 Lozenge Oral three times a day PRN Sore Throat  HYDROmorphone  Injectable 0.5 milliGRAM(s) IV Push every 6 hours PRN Severe Pain (7 - 10)  ondansetron Injectable 4 milliGRAM(s) IV Push every 6 hours PRN Nausea      Vital Signs Last 24 Hrs  T(C): 37 (15 Jul 2021 04:36), Max: 37 (15 Jul 2021 04:36)  T(F): 98.6 (15 Jul 2021 04:36), Max: 98.6 (15 Jul 2021 04:36)  HR: 81 (15 Jul 2021 04:36) (62 - 95)  BP: 127/74 (15 Jul 2021 04:36) (118/60 - 149/83)  BP(mean): 91 (2021 17:52) (83 - 92)  RR: 16 (15 Jul 2021 04:36) (14 - 18)  SpO2: 98% (15 Jul 2021 04:36) (94% - 99%)    Physical Exam:  General: NAD, resting comfortably in bed  Pulmonary: Nonlabored breathing, no respiratory distress  Cardiovascular: NSR  Abdominal: midline incision C/D/I, soft, nondistended, appropriately tender  : sherry    I&O's Summary    2021 07:01  -  15 Jul 2021 07:00  --------------------------------------------------------  IN: 1600 mL / OUT: 2565 mL / NET: -965 mL        LABS:                        13.0   8.65  )-----------( 246      ( 15 Jul 2021 06:56 )             40.0     07-15    140  |  103  |  x   ----------------------------<  114<H>  4.3   |  31  |  0.68    Ca    8.5      15 Jul 2021 06:56  Phos  3.5     07-15  Mg     1.9     07-15    TPro  7.9  /  Alb  4.6  /  TBili  0.4  /  DBili  x   /  AST  39  /  ALT  32  /  AlkPhos  97  07-13    PT/INR - ( 2021 00:19 )   PT: 11.2 sec;   INR: 0.93          PTT - ( 2021 00:19 )  PTT:26.8 sec  Urinalysis Basic - ( 2021 00:19 )    Color: Yellow / Appearance: Clear / S.010 / pH: x  Gluc: x / Ketone: NEGATIVE  / Bili: Negative / Urobili: 0.2 E.U./dL   Blood: x / Protein: NEGATIVE mg/dL / Nitrite: NEGATIVE   Leuk Esterase: Trace / RBC: < 5 /HPF / WBC < 5 /HPF   Sq Epi: x / Non Sq Epi: 0-5 /HPF / Bacteria: Present /HPF      CAPILLARY BLOOD GLUCOSE        LIVER FUNCTIONS - ( 2021 18:23 )  Alb: 4.6 g/dL / Pro: 7.9 g/dL / ALK PHOS: 97 U/L / ALT: 32 U/L / AST: 39 U/L / GGT: x             RADIOLOGY & ADDITIONAL STUDIES:  
GENERAL SURGERY PROGRESS NOTE    SUBJECTIVE: Patient seen and examined bedside with chief resident. No acute events overnight. Patient is tolerating diet, passing gas, no n/v, no SOB, urinating okay. Has had 5 BM today of loose stool which is normal for her given surgical history. Last episode of loose stool at 7:30 PM with nonpainful BRBPR. VSS at that time. Patient has a history of hemorrhoids     heparin   Injectable 5000 Unit(s) SubCutaneous every 8 hours      Vital Signs Last 24 Hrs  T(C): 37 (17 Jul 2021 21:19), Max: 37 (17 Jul 2021 19:43)  T(F): 98.6 (17 Jul 2021 21:19), Max: 98.6 (17 Jul 2021 19:43)  HR: 67 (17 Jul 2021 21:19) (67 - 77)  BP: 146/72 (17 Jul 2021 21:19) (122/71 - 146/72)  BP(mean): --  RR: 12 (17 Jul 2021 21:19) (12 - 17)  SpO2: 95% (17 Jul 2021 21:19) (92% - 96%)  I&O's Detail    16 Jul 2021 07:01  -  17 Jul 2021 07:00  --------------------------------------------------------  IN:    IV PiggyBack: 400 mL    Lactated Ringers: 2160 mL  Total IN: 2560 mL    OUT:    Indwelling Catheter - Urethral (mL): 300 mL    Nasogastric/Oral tube (mL): 350 mL    Voided (mL): 800 mL  Total OUT: 1450 mL    Total NET: 1110 mL      17 Jul 2021 07:01  -  17 Jul 2021 21:26  --------------------------------------------------------  IN:    Lactated Ringers: 90 mL    Oral Fluid: 600 mL  Total IN: 690 mL    OUT:    Voided (mL): 400 mL  Total OUT: 400 mL    Total NET: 290 mL          PHYSICAL EXAM    General: NAD, resting comfortably in bed  C/V: NSR  Pulm: Nonlabored breathing, no respiratory distress on room air  Abd: soft, NT/ND. Midline ex-lap incision is clean, dry, intact. Some healing bruising in upper left quadrant. Appropriate tenderness to palpation at incision.  Extrem: WWP, no edema, SCDs in place        LABS:                        10.4   7.71  )-----------( 215      ( 17 Jul 2021 11:46 )             32.5     07-17    140  |  101  |  10  ----------------------------<  78  3.7   |  25  |  0.62    Ca    8.4      17 Jul 2021 11:46  Phos  2.8     07-17  Mg     1.8     07-17            RADIOLOGY & ADDITIONAL STUDIES:  
SUBJECTIVE: pt examined at bedside this morning. Pt is feeling well, pain improved. Pt having f/bm. Pt continuing to have BRPBPR. Denies n/v, CP, SOB. Denies lightheadedness/dizziness.      MEDICATIONS  (STANDING):  BUpivacaine liposome 1.3% Injectable (no eMAR) 20 milliLiter(s) Local Injection once  heparin   Injectable 5000 Unit(s) SubCutaneous every 8 hours    MEDICATIONS  (PRN):  acetaminophen   Tablet .. 650 milliGRAM(s) Oral every 6 hours PRN Mild Pain (1 - 3)  benzocaine 15 mG/menthol 3.6 mG Lozenge 1 Lozenge Oral three times a day PRN Sore Throat  ondansetron Injectable 4 milliGRAM(s) IV Push every 6 hours PRN Nausea  oxyCODONE    IR 5 milliGRAM(s) Oral every 6 hours PRN Moderate Pain (4 - 6)      Vital Signs Last 24 Hrs  T(C): 36.8 (19 Jul 2021 05:30), Max: 37.1 (18 Jul 2021 13:59)  T(F): 98.3 (19 Jul 2021 05:30), Max: 98.7 (18 Jul 2021 13:59)  HR: 64 (19 Jul 2021 05:30) (64 - 72)  BP: 150/65 (19 Jul 2021 05:30) (127/76 - 150/82)  BP(mean): --  RR: 17 (19 Jul 2021 05:30) (17 - 18)  SpO2: 94% (19 Jul 2021 05:30) (93% - 96%)    Physical Exam:  General: NAD, resting comfortably in bed  Pulmonary: Nonlabored breathing, no respiratory distress  Cardiovascular: NSR  Abdominal: soft, mildly tenderness, no distension, no peritoneal signs, midline incision C/D/I      I&O's Summary    18 Jul 2021 07:01  -  19 Jul 2021 07:00  --------------------------------------------------------  IN: 820 mL / OUT: 600 mL / NET: 220 mL        LABS:                        9.9    5.40  )-----------( 224      ( 19 Jul 2021 07:14 )             30.8     07-18    141  |  103  |  8   ----------------------------<  89  3.7   |  24  |  0.58    Ca    8.3<L>      18 Jul 2021 08:13  Phos  3.0     07-18  Mg     1.9     07-18          CAPILLARY BLOOD GLUCOSE            RADIOLOGY & ADDITIONAL STUDIES:  
    SUBJECTIVE:  Doing well this AM. NAEON. Endorses f/loose bm. Denies n/v. Denies cp/sob. States she has been having painless BRBPR w/ BM. Denies lightheadedness/dizziness.    MEDICATIONS  (STANDING):  BUpivacaine liposome 1.3% Injectable (no eMAR) 20 milliLiter(s) Local Injection once  heparin   Injectable 5000 Unit(s) SubCutaneous every 8 hours    MEDICATIONS  (PRN):  acetaminophen   Tablet .. 650 milliGRAM(s) Oral every 6 hours PRN Mild Pain (1 - 3)  benzocaine 15 mG/menthol 3.6 mG Lozenge 1 Lozenge Oral three times a day PRN Sore Throat  ondansetron Injectable 4 milliGRAM(s) IV Push every 6 hours PRN Nausea  oxyCODONE    IR 5 milliGRAM(s) Oral every 6 hours PRN Moderate Pain (4 - 6)      Vital Signs Last 24 Hrs  T(C): 37.1 (18 Jul 2021 13:59), Max: 37.1 (18 Jul 2021 13:59)  T(F): 98.7 (18 Jul 2021 13:59), Max: 98.7 (18 Jul 2021 13:59)  HR: 69 (18 Jul 2021 13:59) (63 - 77)  BP: 142/76 (18 Jul 2021 13:59) (122/71 - 154/79)  BP(mean): --  RR: 18 (18 Jul 2021 13:59) (12 - 18)  SpO2: 95% (18 Jul 2021 13:59) (92% - 97%)    Physical Exam:  General: NAD, resting comfortably in bed  Pulmonary: Nonlabored breathing, no respiratory distress  Cardiovascular: NSR  Abdominal: soft, NT/ND, incision clean/dry/intact   Extremities: WWP, normal strength      I&O's Summary    17 Jul 2021 07:01  -  18 Jul 2021 07:00  --------------------------------------------------------  IN: 990 mL / OUT: 650 mL / NET: 340 mL    18 Jul 2021 07:01  -  18 Jul 2021 14:05  --------------------------------------------------------  IN: 460 mL / OUT: 300 mL / NET: 160 mL        LABS:                        10.1   6.53  )-----------( 218 ( 18 Jul 2021 08:13 )             31.7     07-18    141  |  103  |  8   ----------------------------<  89  3.7   |  24  |  0.58    Ca    8.3<L>      18 Jul 2021 08:13  Phos  3.0     07-18  Mg     1.9     07-18          CAPILLARY BLOOD GLUCOSE            RADIOLOGY & ADDITIONAL STUDIES:  
24 hr events:  ON: admitted for closed loop SBO, lactate 2.3, 1L bolus LR, NGT placed, CXR confirmed      SUBJECTIVE:  Pt seen and examined by chief resident. Pt is doing well, resting comfortably on bed. Denies abdominal pain, no nausea or vomiting with NGT in place.    Vital Signs Last 24 Hrs  T(C): 36.7 (2021 05:43), Max: 36.7 (2021 22:09)  T(F): 98.1 (2021 05:43), Max: 98.1 (2021 04:38)  HR: 67 (2021 05:43) (57 - 74)  BP: 137/73 (2021 05:43) (105/55 - 146/74)  BP(mean): --  RR: 18 (2021 05:43) (16 - 18)  SpO2: 99% (2021 05:43) (95% - 100%)    Physical Exam:  General: NAD  HEENT: NGT in place  Pulmonary: Nonlabored breathing, no respiratory distress  Abdominal: soft, nontender, mildly distended no rebound or guarding.   Extremities: WWP, SCDs in place    I&O's Summary    2021 07:01  -  2021 06:53  --------------------------------------------------------  IN: 0 mL / OUT: 500 mL / NET: -500 mL        LABS:                        12.3   6.30  )-----------( 156      ( 2021 05:47 )             38.5     07-14    139  |  103  |  12  ----------------------------<  124<H>  4.2   |  26  |  0.65    Ca    9.0      2021 05:47  Phos  3.5     07-14  Mg     1.8     07-14    TPro  7.9  /  Alb  4.6  /  TBili  0.4  /  DBili  x   /  AST  39  /  ALT  32  /  AlkPhos  97  07-13    PT/INR - ( 2021 00:19 )   PT: 11.2 sec;   INR: 0.93       PTT - ( 2021 00:19 )  PTT:26.8 sec    Urinalysis Basic - ( 2021 00:19 )  Color: Yellow / Appearance: Clear / S.010 / pH: x  Gluc: x / Ketone: NEGATIVE  / Bili: Negative / Urobili: 0.2 E.U./dL   Blood: x / Protein: NEGATIVE mg/dL / Nitrite: NEGATIVE   Leuk Esterase: Trace / RBC: < 5 /HPF / WBC < 5 /HPF   Sq Epi: x / Non Sq Epi: 0-5 /HPF / Bacteria: Present /HPF    CAPILLARY BLOOD GLUCOSE  POCT Blood Glucose.: 125 mg/dL (2021 19:08)    LIVER FUNCTIONS - ( 2021 18:23 )  Alb: 4.6 g/dL / Pro: 7.9 g/dL / ALK PHOS: 97 U/L / ALT: 32 U/L / AST: 39 U/L / GGT: x

## 2021-07-19 NOTE — PROGRESS NOTE ADULT - REASON FOR ADMISSION
abdominal pain, closed loop SBO

## 2021-07-19 NOTE — DISCHARGE NOTE PROVIDER - NSDCMRMEDTOKEN_GEN_ALL_CORE_FT
cholestyramine 4 g/5 g oral powder for reconstitution: 1 application orally 2 times a day   acetaminophen 325 mg oral tablet: 2 tab(s) orally every 6 hours, As needed, Mild Pain (1 - 3)  cholestyramine 4 g/5 g oral powder for reconstitution: 1 application orally 2 times a day

## 2021-07-19 NOTE — PROGRESS NOTE ADULT - ASSESSMENT
74yo F with no significant PMH and PSH open appendectomy, ileocecectomy for adhesive SBO (on cholestyramine for chronic post-operative diarrhea), R salpingooophorectomy for ectopic pregnancy, cholecystectomy, and L breast lumpectomy for benign tumor, who presented to ED with 1 day of intermittent, crampy abdominal pain associated with N and retching without vomiting and watery diarrhea. WBC elevated (13.48) with left shift, lactate 2.3. CTAP with PO contrast significant for closed-loop obstruction in the anterior right lower quadrant adjacent to the ileocolic anastomosis and incidental intrahepatic and extrahepatic biliary dilatation without choledocholithiasis. POD 1 s/p ex lap and LILLIANA (7/14)    Diet: NPO/NGT  IVF  Pain/nausea control PRN  Incentive spirometer/OOB/Ambulate  AM labs  Enriquez  
74yo F with no significant PMH and PSH open appendectomy, ileocecectomy for adhesive SBO (on cholestyramine for chronic post-operative diarrhea), R salpingooophorectomy for ectopic pregnancy, cholecystectomy, and L breast lumpectomy for benign tumor, who presented to ED with 1 day of intermittent, crampy abdominal pain associated with N and retching without vomiting and watery diarrhea. WBC elevated (13.48) with left shift, lactate 2.3. CTAP with PO contrast significant for closed-loop obstruction in the anterior right lower quadrant adjacent to the ileocolic anastomosis and incidental intrahepatic and extrahepatic biliary dilatation without choledocholithiasis. s/p ex lap and LILLIANA (7/14)    Low residue diet  Pain/Nausea control PRN   Home meds as appropriate (holding cholestyramine in the setting of SBO)  OOBA/SCDs/SQH/IS   AM labs  
76yo F with no significant PMH and PSH open appendectomy, ileocecectomy for adhesive SBO (on cholestyramine for chronic post-operative diarrhea), R salpingooophorectomy for ectopic pregnancy, cholecystectomy, and L breast lumpectomy for benign tumor, who presented to ED with 1 day of intermittent, crampy abdominal pain associated with N and retching without vomiting and watery diarrhea. WBC elevated (13.48) with left shift, lactate 2.3. CTAP with PO contrast significant for closed-loop obstruction in the anterior right lower quadrant adjacent to the ileocolic anastomosis and incidental intrahepatic and extrahepatic biliary dilatation without choledocholithiasis. s/p ex lap and LILLIANA (7/14)    NPO/IVF  Pain/Nausea control PRN   Home meds as appropriate  OOBA/SCDs/SQH/IS   AM labs  Enriquez  
76yo F with no significant PMH and PSH open appendectomy, ileocecectomy for adhesive SBO (on cholestyramine for chronic post-operative diarrhea), R salpingooophorectomy for ectopic pregnancy, cholecystectomy, and L breast lumpectomy for benign tumor, who presented to ED with 1 day of intermittent, crampy abdominal pain associated with N and retching without vomiting and watery diarrhea. WBC elevated (13.48) with left shift, lactate 2.3. CTAP with PO contrast significant for closed-loop obstruction in the anterior right lower quadrant adjacent to the ileocolic anastomosis and incidental intrahepatic and extrahepatic biliary dilatation without choledocholithiasis. s/p ex lap and LILLIANA (7/14).    Low residue diet  Pain/Nausea control PRN   Home meds as appropriate (holding cholestyramine in the setting of SBO)  OOBA/SCDs/SQH/IS   AM labs
74yo F with no significant PMH and PSH open appendectomy, ileocecectomy for adhesive SBO (on cholestyramine for chronic post-operative diarrhea), R salpingooophorectomy for ectopic pregnancy, cholecystectomy, and L breast lumpectomy for benign tumor, who presented to ED with 1 day of intermittent, crampy abdominal pain associated with N and retching without vomiting and watery diarrhea. WBC elevated (13.48) with left shift, lactate 2.3. CTAP with PO contrast significant for closed-loop obstruction in the anterior right lower quadrant adjacent to the ileocolic anastomosis and incidental intrahepatic and extrahepatic biliary dilatation without choledocholithiasis. Added on for exploratory laparotomy, possible LILLIANA, possible bowel resection this AM.     NPO/IVF  Pain/Nausea control PRN   Serial abdominal exams   Home meds as appropriate (holding cholestyramine in the setting of SBO)  OOBA/SCDs/SQH/IS   AM labs  Added on for exploratory laparotomy, possible LILLIANA, possible bowel resection in AM  
76yo F with no significant PMH and PSH open appendectomy, ileocecectomy for adhesive SBO (on cholestyramine for chronic post-operative diarrhea), R salpingooophorectomy for ectopic pregnancy, cholecystectomy, and L breast lumpectomy for benign tumor, who presented to ED with 1 day of intermittent, crampy abdominal pain associated with N and retching without vomiting and watery diarrhea. WBC elevated (13.48) with left shift, lactate 2.3. CTAP with PO contrast significant for closed-loop obstruction in the anterior right lower quadrant adjacent to the ileocolic anastomosis and incidental intrahepatic and extrahepatic biliary dilatation without choledocholithiasis. s/p ex lap and LILLIANA (7/14).    Low residue diet  Pain/Nausea control PRN   Home meds as appropriate (holding cholestyramine in the setting of SBO)  OOBA/SCDs/SQH/IS   AM labs  
76yo F with no significant PMH and PSH open appendectomy, ileocecectomy for adhesive SBO (on cholestyramine for chronic post-operative diarrhea), R salpingooophorectomy for ectopic pregnancy, cholecystectomy, and L breast lumpectomy for benign tumor, who presented to ED with 1 day of intermittent, crampy abdominal pain associated with N and retching without vomiting and watery diarrhea. WBC elevated (13.48) with left shift, lactate 2.3. CTAP with PO contrast significant for closed-loop obstruction in the anterior right lower quadrant adjacent to the ileocolic anastomosis and incidental intrahepatic and extrahepatic biliary dilatation without choledocholithiasis. s/p ex lap and LILLIANA (7/14)    NPO/IVF  NGT  Pain/Nausea control PRN   Home meds as appropriate  OOBA/SCDs/SQH/IS   AM labs  Bart powell today

## 2021-07-19 NOTE — DISCHARGE NOTE NURSING/CASE MANAGEMENT/SOCIAL WORK - NSDCPNINST_GEN_ALL_CORE
Call Dr. Mendoza if you have any questions or concerns. Educational material given: Surgical Wound Discharge Instructions ([employer])

## 2021-07-19 NOTE — DISCHARGE NOTE PROVIDER - CARE PROVIDER_API CALL
Maximo Mendoza  SURGERY  [address] [place] [address]  Phone: [phone]  Fax: [phone]  Follow Up Time: 1 week

## 2021-07-19 NOTE — DISCHARGE NOTE PROVIDER - HOSPITAL COURSE
Mrs. Ayala is a 76yo F with PMH of hemorrhoids and PSH significant for open appendectomy, ileocecectomy for adhesive SBO (on cholestyramine for chronic post-operative diarrhea), R salpingooophorectomy for ectopic pregnancy, cholecystectomy, L breast lumpectomy for benign tumor, and hemorroidectomy, who presented to ED with 1 day of intermittent, crampy abdominal pain associated with nausea without vomiting. The morning of presentation she woke up with abdominal pain after eating corn the night before. Endorses some retching and spit up of coffee in the afternoon.  She also reports onset of watery, non bloody diarrhea, which has become more watery throughout the day. Describes similar episode of pain, retching and diarrhea about 1.5 weeks ago after a meal of corn and lamb chops which she treated with NPO diet at home. Denies SOB, changes in urinary habits, lightheadedness/dizziness. Endorses mild CP.    In the ED, exam was significant for tenderness to palpation in the right lower quadrant and suprapubic region without guarding or rigidity. Labs demonstrated leukocytosis with neutrophilic predominance. EKG performed was within normal limits. CTAP significant for closed loop obstruction at the site of ileocecal anastomosis from prior surgery. NGT was placed and surgery team was consulted.    Patient underwent exploratory laparotomy with lysis of adhesions on 7/14. Patient was recovering appropriately without nausea or vomiting. NGT was removed after return of bowel function on 7/16. Patient complained of "irregular heartbeat," at which time the EKG was normal. She also experienced painless bright red blood per rectum; vital signs were stable and CBC was within normal limits. Mrs. Ayala is a 76yo F with PMH of hemorrhoids and PSH significant for open appendectomy, ileocecectomy for adhesive SBO (on cholestyramine for chronic post-operative diarrhea), R salpingooophorectomy for ectopic pregnancy, cholecystectomy, L breast lumpectomy for benign tumor, and hemorroidectomy, who presented to ED with 1 day of intermittent, crampy abdominal pain associated with nausea without vomiting. The morning of presentation she woke up with abdominal pain after eating corn the night before. Endorsed some retching and spit up of coffee in the afternoon.  She also reported onset of watery, non bloody diarrhea, which had become more watery throughout the day. Described similar episode of pain, retching and diarrhea about 1.5 weeks ago after a meal of corn and lamb chops which she treated with NPO diet at home. Denied SOB, changes in urinary habits, lightheadedness/dizziness. Endorses mild CP.    In the ED, exam was significant for tenderness to palpation in the right lower quadrant and suprapubic region without guarding or rigidity. Labs demonstrated leukocytosis with neutrophilic predominance. EKG performed was within normal limits. CTAP significant for closed loop obstruction at the site of ileocecal anastomosis from prior surgery. NGT was placed and surgery team was consulted.    Patient underwent exploratory laparotomy with lysis of adhesions on 7/14. Patient was recovering appropriately without nausea or vomiting. NGT was removed after return of bowel function on 7/16. Patient complained of "irregular heartbeat," at which time the EKG was normal. She also experienced painless bright red blood per rectum; vital signs were stable and CBC was within normal limits. On day of discharge, patient is tolerating diet and pain is well controlled. She is HDS and stable for discharge home. Pt will follow up with Dr. Mendoza within 1-2 weeks. Mrs. Ayala is a 74yo F with PMH of hemorrhoids and PSH significant for open appendectomy, ileocecectomy for adhesive SBO (on cholestyramine for chronic post-operative diarrhea), R salpingooophorectomy for ectopic pregnancy, cholecystectomy, L breast lumpectomy for benign tumor, and hemorroidectomy, who presented to ED with 1 day of intermittent, crampy abdominal pain associated with nausea without vomiting. The morning of presentation she woke up with abdominal pain after eating corn the night before. Endorsed some retching and spit up of coffee in the afternoon.  She also reported onset of watery, non bloody diarrhea, which had become more watery throughout the day. Described similar episode of pain, retching and diarrhea about 1.5 weeks ago after a meal of corn and lamb chops which she treated with NPO diet at home. Denied SOB, changes in urinary habits, lightheadedness/dizziness. Endorses mild CP.    In the ED, exam was significant for tenderness to palpation in the right lower quadrant and suprapubic region without guarding or rigidity. Labs demonstrated leukocytosis with neutrophilic predominance. EKG performed was within normal limits. CTAP significant for closed loop obstruction at the site of ileocecal anastomosis from prior surgery. NGT was placed and surgery team was consulted.    Patient underwent exploratory laparotomy with lysis of adhesions on 7/14. Patient was recovering appropriately without nausea or vomiting. NGT was removed after return of bowel function on 7/16. Patient complained of "irregular heartbeat," at which time the EKG was normal. She also experienced painless bright red blood per rectum; vital signs were stable and CBC was within normal limits. On day of discharge, patient is tolerating diet and pain is well controlled. She is HDS and stable for discharge home. Pt will follow up with Dr. Mendoza in 1 week.

## 2021-07-19 NOTE — DISCHARGE NOTE PROVIDER - NSDCFUADDAPPT_GEN_ALL_CORE_FT
Please follow up with Dr. Mendoza in 1 week  Please follow up with PCP for screening of incidental lung nodule

## 2021-07-19 NOTE — DISCHARGE NOTE NURSING/CASE MANAGEMENT/SOCIAL WORK - NSDCVIVACCINE_GEN_ALL_CORE_FT
Tdap; 20-Apr-2019 19:38; Radha Bazan (RN); [employer]; [id number] (Exp. Date: 26-Feb-2021); IntraMuscular; Deltoid Left.; 0.5 milliLiter(s); VIS (VIS Published: 09-May-2013, VIS Presented: 20-Apr-2019);

## 2021-07-19 NOTE — DISCHARGE NOTE NURSING/CASE MANAGEMENT/SOCIAL WORK - PATIENT PORTAL LINK FT
You can access the FollowMyHealth Patient Portal offered by [facility] by registering at the following website: [email or web address]. By joining [facility] FollowMyHealth portal, you will also be able to view your health information using other applications (apps) compatible with our system.

## 2021-07-31 LAB — SURGICAL PATHOLOGY STUDY: SIGNIFICANT CHANGE UP

## 2021-08-12 ENCOUNTER — APPOINTMENT (OUTPATIENT)
Dept: HEART AND VASCULAR | Facility: CLINIC | Age: 76
End: 2021-08-12
Payer: MEDICARE

## 2021-08-12 ENCOUNTER — APPOINTMENT (OUTPATIENT)
Dept: HEART AND VASCULAR | Facility: CLINIC | Age: 76
End: 2021-08-12

## 2021-08-12 ENCOUNTER — NON-APPOINTMENT (OUTPATIENT)
Age: 76
End: 2021-08-12

## 2021-08-12 VITALS
HEIGHT: 65 IN | WEIGHT: 124 LBS | BODY MASS INDEX: 20.66 KG/M2 | DIASTOLIC BLOOD PRESSURE: 80 MMHG | TEMPERATURE: 97.9 F | SYSTOLIC BLOOD PRESSURE: 126 MMHG | HEART RATE: 72 BPM

## 2021-08-12 PROBLEM — K52.9 NONINFECTIVE GASTROENTERITIS AND COLITIS, UNSPECIFIED: Chronic | Status: ACTIVE | Noted: 2021-07-14

## 2021-08-12 PROCEDURE — 99214 OFFICE O/P EST MOD 30 MIN: CPT | Mod: 25

## 2021-08-12 PROCEDURE — 93000 ELECTROCARDIOGRAM COMPLETE: CPT

## 2021-08-12 PROCEDURE — 36415 COLL VENOUS BLD VENIPUNCTURE: CPT

## 2021-08-12 PROCEDURE — 99072 ADDL SUPL MATRL&STAF TM PHE: CPT

## 2021-08-15 LAB
ANION GAP SERPL CALC-SCNC: 11 MMOL/L
BUN SERPL-MCNC: 13 MG/DL
CALCIUM SERPL-MCNC: 9.6 MG/DL
CHLORIDE SERPL-SCNC: 104 MMOL/L
CO2 SERPL-SCNC: 25 MMOL/L
CREAT SERPL-MCNC: 0.76 MG/DL
GLUCOSE SERPL-MCNC: 98 MG/DL
HCT VFR BLD CALC: 36.4 %
HGB BLD-MCNC: 11.6 G/DL
POTASSIUM SERPL-SCNC: 4.5 MMOL/L
SODIUM SERPL-SCNC: 140 MMOL/L
TSH SERPL-ACNC: 3.2 UIU/ML

## 2021-08-15 RX ORDER — CHOLESTYRAMINE 4 G/9G
POWDER, FOR SUSPENSION ORAL
Refills: 0 | Status: ACTIVE | COMMUNITY

## 2021-08-15 NOTE — HISTORY OF PRESENT ILLNESS
[FreeTextEntry1] : 1 to 2 months ago the patient felt new fluttering.  The patient recently had abdominal surgery for adhesions.  Last night she had palpitations for 10 to 15 minutes.  She walks half a mile which after hospitalization leads to dyspnea.  For hospitalization then with no symptoms of shortness of breath.  Patient has 1-2 alcoholic drinks 4-5 times a week.  She has 1-2 caffeinated drinks a day.

## 2021-08-15 NOTE — DISCUSSION/SUMMARY
[FreeTextEntry1] : Patient has had new palpitations.  She has a history of mitral valve prolapse.  Her EKG shows sinus rhythm and RSR prime in V1 and mild ST depression.  This is unchanged.  The patient will undergo a 3-week cardiac monitor.  She will attempt to reduce her intake of alcohol.

## 2021-08-15 NOTE — PHYSICAL EXAM
[Well Developed] : well developed [Well Nourished] : well nourished [No Acute Distress] : no acute distress [Normal Conjunctiva] : normal conjunctiva [Normal Venous Pressure] : normal venous pressure [No Carotid Bruit] : no carotid bruit [No Murmur] : no murmur [Normal S1, S2] : normal S1, S2 [No Rub] : no rub [No Gallop] : no gallop [Clear Lung Fields] : clear lung fields [Good Air Entry] : good air entry [No Respiratory Distress] : no respiratory distress  [Soft] : abdomen soft [Non Tender] : non-tender [No Masses/organomegaly] : no masses/organomegaly [Normal Bowel Sounds] : normal bowel sounds [Normal Gait] : normal gait [No Cyanosis] : no cyanosis [No Clubbing] : no clubbing [No Varicosities] : no varicosities [Edema ___] : edema [unfilled] [No Rash] : no rash [No Skin Lesions] : no skin lesions [Moves all extremities] : moves all extremities [No Focal Deficits] : no focal deficits [Normal Speech] : normal speech [Alert and Oriented] : alert and oriented [Normal memory] : normal memory

## 2021-09-09 ENCOUNTER — APPOINTMENT (OUTPATIENT)
Dept: HEART AND VASCULAR | Facility: CLINIC | Age: 76
End: 2021-09-09

## 2021-09-30 ENCOUNTER — APPOINTMENT (OUTPATIENT)
Dept: HEART AND VASCULAR | Facility: CLINIC | Age: 76
End: 2021-09-30
Payer: MEDICARE

## 2021-09-30 VITALS
WEIGHT: 124 LBS | BODY MASS INDEX: 20.66 KG/M2 | HEART RATE: 61 BPM | HEIGHT: 65 IN | SYSTOLIC BLOOD PRESSURE: 138 MMHG | TEMPERATURE: 98.6 F | DIASTOLIC BLOOD PRESSURE: 78 MMHG

## 2021-09-30 DIAGNOSIS — Z86.79 PERSONAL HISTORY OF OTHER DISEASES OF THE CIRCULATORY SYSTEM: ICD-10-CM

## 2021-09-30 PROCEDURE — 93306 TTE W/DOPPLER COMPLETE: CPT

## 2021-10-01 ENCOUNTER — APPOINTMENT (OUTPATIENT)
Dept: HEART AND VASCULAR | Facility: CLINIC | Age: 76
End: 2021-10-01
Payer: MEDICARE

## 2021-10-01 PROCEDURE — 99443: CPT | Mod: 95

## 2021-10-02 NOTE — HISTORY OF PRESENT ILLNESS
[Home] : at home, [unfilled] , at the time of the visit. [Medical Office: ([facility])___] : at the medical office located in  [Verbal consent obtained from patient] : the patient, [unfilled] [FreeTextEntry1] : The patient feels fluttering multiple times each night.  Each episode is 10 seconds.  It is somewhat positional.  The patient has been sedentary since her surgery in July.  On 1 occasion she walks 17 blocks without difficulty and also climbs a flight of stairs.  She has noticed varicosities.  She has taken her diuretic daily and these have improved.  She has 1 alcoholic drink and 1 caffeinated beverage daily.

## 2021-10-02 NOTE — DISCUSSION/SUMMARY
[FreeTextEntry1] : The patient has frequent short symptomatic episodes of supraventricular tachycardia.  The heart rate on monitor is occasionally slow but the average is 78.  She will start metoprolol 25 mg daily.  She will monitor her blood pressure and heart rate.  The patient has mild ST depression on her EKG.  She will return for stress echocardiogram.

## 2021-12-16 ENCOUNTER — APPOINTMENT (OUTPATIENT)
Dept: HEART AND VASCULAR | Facility: CLINIC | Age: 76
End: 2021-12-16
Payer: MEDICARE

## 2021-12-16 VITALS
SYSTOLIC BLOOD PRESSURE: 110 MMHG | HEIGHT: 65 IN | BODY MASS INDEX: 20.16 KG/M2 | DIASTOLIC BLOOD PRESSURE: 72 MMHG | HEART RATE: 83 BPM | WEIGHT: 121 LBS

## 2021-12-16 DIAGNOSIS — R00.2 PALPITATIONS: ICD-10-CM

## 2021-12-16 PROCEDURE — 99214 OFFICE O/P EST MOD 30 MIN: CPT | Mod: 25

## 2021-12-16 PROCEDURE — 93015 CV STRESS TEST SUPVJ I&R: CPT

## 2021-12-17 PROBLEM — R00.2 PALPITATIONS: Status: ACTIVE | Noted: 2021-08-15

## 2021-12-17 NOTE — DISCUSSION/SUMMARY
[FreeTextEntry1] : The patient has an abnormal EKG.  She is physically active without chest pain.  The stress EKG is normal.  Significant coronary artery disease is unlikely.  She has had supraventricular tachycardia.  Her palpitations are brief.  She will continue metoprolol.

## 2021-12-17 NOTE — HISTORY OF PRESENT ILLNESS
[FreeTextEntry1] : The patient has felt fluttering in bed.  It last occurred 2 nights ago when she was sleeping poorly.  It last for seconds.  She walks 1 to 1-1/2 miles without difficulty.

## 2022-03-08 RX ORDER — METOPROLOL SUCCINATE 25 MG/1
25 TABLET, EXTENDED RELEASE ORAL DAILY
Qty: 90 | Refills: 1 | Status: DISCONTINUED | COMMUNITY
Start: 2021-10-01 | End: 2022-03-08

## 2022-03-10 ENCOUNTER — RX RENEWAL (OUTPATIENT)
Age: 77
End: 2022-03-10

## 2022-03-22 ENCOUNTER — INPATIENT (INPATIENT)
Facility: HOSPITAL | Age: 77
LOS: 1 days | Discharge: HOME | DRG: 313 | End: 2022-03-24
Attending: INTERNAL MEDICINE | Admitting: INTERNAL MEDICINE
Payer: MEDICARE

## 2022-03-22 VITALS
SYSTOLIC BLOOD PRESSURE: 129 MMHG | RESPIRATION RATE: 17 BRPM | OXYGEN SATURATION: 99 % | HEIGHT: 65 IN | HEART RATE: 73 BPM | DIASTOLIC BLOOD PRESSURE: 81 MMHG | WEIGHT: 123.02 LBS | TEMPERATURE: 98 F

## 2022-03-22 DIAGNOSIS — Z90.49 ACQUIRED ABSENCE OF OTHER SPECIFIED PARTS OF DIGESTIVE TRACT: Chronic | ICD-10-CM

## 2022-03-22 DIAGNOSIS — Z90.721 ACQUIRED ABSENCE OF OVARIES, UNILATERAL: Chronic | ICD-10-CM

## 2022-03-22 DIAGNOSIS — Z98.890 OTHER SPECIFIED POSTPROCEDURAL STATES: Chronic | ICD-10-CM

## 2022-03-22 LAB
ALBUMIN SERPL ELPH-MCNC: 4.1 G/DL — SIGNIFICANT CHANGE UP (ref 3.3–5)
ALP SERPL-CCNC: 79 U/L — SIGNIFICANT CHANGE UP (ref 40–120)
ALT FLD-CCNC: 22 U/L — SIGNIFICANT CHANGE UP (ref 10–45)
ANION GAP SERPL CALC-SCNC: 12 MMOL/L — SIGNIFICANT CHANGE UP (ref 5–17)
APTT BLD: 28.2 SEC — SIGNIFICANT CHANGE UP (ref 27.5–35.5)
AST SERPL-CCNC: 20 U/L — SIGNIFICANT CHANGE UP (ref 10–40)
BASOPHILS # BLD AUTO: 0.07 K/UL — SIGNIFICANT CHANGE UP (ref 0–0.2)
BASOPHILS NFR BLD AUTO: 0.9 % — SIGNIFICANT CHANGE UP (ref 0–2)
BILIRUB SERPL-MCNC: 0.4 MG/DL — SIGNIFICANT CHANGE UP (ref 0.2–1.2)
BUN SERPL-MCNC: 18 MG/DL — SIGNIFICANT CHANGE UP (ref 7–23)
CALCIUM SERPL-MCNC: 9.1 MG/DL — SIGNIFICANT CHANGE UP (ref 8.4–10.5)
CHLORIDE SERPL-SCNC: 104 MMOL/L — SIGNIFICANT CHANGE UP (ref 96–108)
CK MB CFR SERPL CALC: 3.1 NG/ML — SIGNIFICANT CHANGE UP (ref 0–6.7)
CK SERPL-CCNC: 121 U/L — SIGNIFICANT CHANGE UP (ref 25–170)
CO2 SERPL-SCNC: 26 MMOL/L — SIGNIFICANT CHANGE UP (ref 22–31)
CREAT SERPL-MCNC: 0.78 MG/DL — SIGNIFICANT CHANGE UP (ref 0.5–1.3)
EGFR: 78 ML/MIN/1.73M2 — SIGNIFICANT CHANGE UP
EOSINOPHIL # BLD AUTO: 0.17 K/UL — SIGNIFICANT CHANGE UP (ref 0–0.5)
EOSINOPHIL NFR BLD AUTO: 2.3 % — SIGNIFICANT CHANGE UP (ref 0–6)
GLUCOSE SERPL-MCNC: 92 MG/DL — SIGNIFICANT CHANGE UP (ref 70–99)
HCT VFR BLD CALC: 37.3 % — SIGNIFICANT CHANGE UP (ref 34.5–45)
HGB BLD-MCNC: 12.3 G/DL — SIGNIFICANT CHANGE UP (ref 11.5–15.5)
IMM GRANULOCYTES NFR BLD AUTO: 0.4 % — SIGNIFICANT CHANGE UP (ref 0–1.5)
INR BLD: 0.92 — SIGNIFICANT CHANGE UP (ref 0.88–1.16)
LYMPHOCYTES # BLD AUTO: 2 K/UL — SIGNIFICANT CHANGE UP (ref 1–3.3)
LYMPHOCYTES # BLD AUTO: 27.1 % — SIGNIFICANT CHANGE UP (ref 13–44)
MAGNESIUM SERPL-MCNC: 1.9 MG/DL — SIGNIFICANT CHANGE UP (ref 1.6–2.6)
MCHC RBC-ENTMCNC: 31 PG — SIGNIFICANT CHANGE UP (ref 27–34)
MCHC RBC-ENTMCNC: 33 GM/DL — SIGNIFICANT CHANGE UP (ref 32–36)
MCV RBC AUTO: 94 FL — SIGNIFICANT CHANGE UP (ref 80–100)
MONOCYTES # BLD AUTO: 0.82 K/UL — SIGNIFICANT CHANGE UP (ref 0–0.9)
MONOCYTES NFR BLD AUTO: 11.1 % — SIGNIFICANT CHANGE UP (ref 2–14)
NEUTROPHILS # BLD AUTO: 4.28 K/UL — SIGNIFICANT CHANGE UP (ref 1.8–7.4)
NEUTROPHILS NFR BLD AUTO: 58.2 % — SIGNIFICANT CHANGE UP (ref 43–77)
NRBC # BLD: 0 /100 WBCS — SIGNIFICANT CHANGE UP (ref 0–0)
NT-PROBNP SERPL-SCNC: 67 PG/ML — SIGNIFICANT CHANGE UP (ref 0–300)
PLATELET # BLD AUTO: 248 K/UL — SIGNIFICANT CHANGE UP (ref 150–400)
POTASSIUM SERPL-MCNC: 3.6 MMOL/L — SIGNIFICANT CHANGE UP (ref 3.5–5.3)
POTASSIUM SERPL-SCNC: 3.6 MMOL/L — SIGNIFICANT CHANGE UP (ref 3.5–5.3)
PROT SERPL-MCNC: 6.6 G/DL — SIGNIFICANT CHANGE UP (ref 6–8.3)
PROTHROM AB SERPL-ACNC: 10.9 SEC — SIGNIFICANT CHANGE UP (ref 10.5–13.4)
RBC # BLD: 3.97 M/UL — SIGNIFICANT CHANGE UP (ref 3.8–5.2)
RBC # FLD: 12.4 % — SIGNIFICANT CHANGE UP (ref 10.3–14.5)
SARS-COV-2 RNA SPEC QL NAA+PROBE: NEGATIVE — SIGNIFICANT CHANGE UP
SODIUM SERPL-SCNC: 142 MMOL/L — SIGNIFICANT CHANGE UP (ref 135–145)
TROPONIN T SERPL-MCNC: 0.01 NG/ML — SIGNIFICANT CHANGE UP (ref 0–0.01)
WBC # BLD: 7.37 K/UL — SIGNIFICANT CHANGE UP (ref 3.8–10.5)
WBC # FLD AUTO: 7.37 K/UL — SIGNIFICANT CHANGE UP (ref 3.8–10.5)

## 2022-03-22 PROCEDURE — 99285 EMERGENCY DEPT VISIT HI MDM: CPT | Mod: CS,25

## 2022-03-22 PROCEDURE — 93010 ELECTROCARDIOGRAM REPORT: CPT

## 2022-03-22 PROCEDURE — 71045 X-RAY EXAM CHEST 1 VIEW: CPT | Mod: 26,77

## 2022-03-22 PROCEDURE — 71045 X-RAY EXAM CHEST 1 VIEW: CPT | Mod: 26

## 2022-03-22 RX ORDER — ENOXAPARIN SODIUM 100 MG/ML
30 INJECTION SUBCUTANEOUS EVERY 24 HOURS
Refills: 0 | Status: DISCONTINUED | OUTPATIENT
Start: 2022-03-22 | End: 2022-03-23

## 2022-03-22 NOTE — H&P ADULT - NSHPPHYSICALEXAM_GEN_ALL_CORE
Appearance: Normal	  HEENT:   Normal oral mucosa, PERRL, EOMI	  Neck: Supple, - JVD; No Carotid Bruit and 2+ pulses B/L  Cardiovascular: Normal S1 S2, No JVD, No murmurs  Respiratory: Lungs clear to auscultation, no Rales, Rhonchi, Wheezing	  Gastrointestinal:  Soft, Non-tender, + BS	  Skin: No rashes, No ecchymoses, No cyanosis  Extremities: Normal range of motion, No clubbing, cyanosis or edema  Vascular: Femoral pulses 2+ b/l without bruit, DP 1+ b/l, PT 1+ b/l  Neurologic: Non-focal  Psychiatry: A & O x 3, Mood & affect appropriate

## 2022-03-22 NOTE — ED ADULT NURSE NOTE - OBJECTIVE STATEMENT
pt a&ox4 ambulatory comes to ED for a feeling of chest tightness that began approx 1800 this evening after pt. underwent PT session for her hip pain. Pt. reports she was walking after her physical therapy, the tightness reported was center of the chest, non-radiating, denies pain with the sensation, rated 3/10, described as "dull". Pt. reports she stopped walking, the sensation resolved, when she started walking again, it had returned. Pt. has hx of undergoing stress tests and reports all results in past have come back normal. pt reports she has been on metoprolol that was changed to Diltiazem last week, for a hx of "her heart skipping a beat". Pt. has undergone halter monitor studies also reported to have come back normal. Pt. denies sob, cp, palpitations, n/v/d, dizziness, visual disturbances.

## 2022-03-22 NOTE — H&P ADULT - NSHPREVIEWOFSYSTEMS_GEN_ALL_CORE
GENERAL, CONSTITUTIONAL : denies recent weight loss, fever, chills  EYES, VISION: denies changes in vision   EARS, NOSE, THROAT: denies hearing loss  HEART, CARDIOVASCULAR:  + chest pain, SOB, palpitations. Denies arrhythmia, LE edema, claudication  RESPIRATORY: + SOB. Denies cough, wheezing, PND, orthopnea  GASTROINTESTINAL: Denies abdominal pain, heartburn, bloody stool, dark tarry stool  GENITOURINARY: Denies frequent urination, urgency  MUSCULOSKELETAL denies joint pain or swelling, restricted motion, musculoskeletal pain.   SKIN & INTEGUMENTARY Denies rashes, sores, blisters, blisters, growths.  NEUROLOGICAL: Denies numbness or tingling sensations, sensation loss, burning.   PSYCHIATRIC: Denies nervousness, anxiety, depression  ENDOCRINE Denies heat or cold intolerance, excessive thirst  HEMATOLOGIC/LYMPHATIC: Denies abnormal bleeding, bleeding of any kind

## 2022-03-22 NOTE — ED PROVIDER NOTE - CLINICAL SUMMARY MEDICAL DECISION MAKING FREE TEXT BOX
avss. nontoxic. NAD. no systemic sx. no active cp. no acute resp distress. no leukocytosis vs significant anemia vs electrolyte abnl. trop wnl. ekg w/o significant st/t changes. cxr w/o acute focal consol vs ptx vs pulm edema vs widened mediastinum. low risk by wells. dr tabor contacted. will admit.

## 2022-03-22 NOTE — ED PROVIDER NOTE - NS ED MD DISPO DIVISION
Albendazole Counseling:  I discussed with the patient the risks of albendazole including but not limited to cytopenia, kidney damage, nausea/vomiting and severe allergy.  The patient understands that this medication is being used in an off-label manner. [facility]

## 2022-03-22 NOTE — ED PROVIDER NOTE - OBJECTIVE STATEMENT
76F L breast lumpectomy benign tumor, remote ectopic pregnancy s/p R salpingooophorectomy, hemorrhoidectomy, remote cholecystectomy, remote open appendectomy, ilocecectomy for adhesive sbo, chronic postop diarrhea on cholestyramine, last hospitalized for closed loop obstruction s/p ex lap (7/14/21), now c/o <24h reproducible nonradiating nonpositional nonpleuritic exertional ss chest pressure when walking back from PT session this afternoon. recently switched from mtp to diltiazem for "hair loss" concern. placed on mtp initially because of "events" on loop recorder done <6mo ago. cardiac stress testing also wnl at that time. +positional lightheadedness since switching from mtp to diltiazem. at baseline just prior. no fever/chills, no uri/cough, no sob, no abd pain/n/v, no rash, no recent travel, no sick contacts, no prior covid, no trauma, no etoh-dpt/ivdu.     cards: tacho tabor 76F L breast lumpectomy benign tumor, remote ectopic pregnancy s/p R salpingooophorectomy, hemorrhoidectomy, remote cholecystectomy, remote open appendectomy, ilocecectomy for adhesive sbo, chronic postop diarrhea on cholestyramine, last hospitalized for closed loop obstruction s/p ex lap (7/14/21), now c/o <24h nonradiating nonpositional nonpleuritic exertional ss chest pressure when walking back from PT session this afternoon, reproducible w/ walking last occurring when walking from cab to triage. recently switched from mtp to diltiazem for "hair loss" concern. placed on mtp initially because of "events" on loop recorder done <6mo ago. cardiac stress testing also wnl at that time. +positional lightheadedness since switching from mtp to diltiazem. at baseline just prior. no fever/chills, no uri/cough, no sob, no abd pain/n/v, no rash, no recent travel, no sick contacts, no prior covid, no trauma, no etoh-dpt/ivdu.     cards: tacho tabor 76F former smoker (15py), L breast lumpectomy benign tumor, remote ectopic pregnancy s/p R salpingooophorectomy, hemorrhoidectomy, remote cholecystectomy, remote open appendectomy, ilocecectomy for adhesive sbo, chronic postop diarrhea on cholestyramine, last hospitalized for closed loop obstruction s/p ex lap (7/14/21), now c/o <24h nonradiating nonpositional nonpleuritic exertional ss chest pressure when walking back from PT session this afternoon, reproducible w/ walking last occurring when walking from cab to triage. recently switched from mtp to diltiazem for "hair loss" concern. placed on mtp initially because of "events" on loop recorder done <6mo ago. cardiac stress testing also wnl at that time. +positional lightheadedness since switching from mtp to diltiazem. at baseline just prior. no fever/chills, no uri/cough, no sob, no abd pain/n/v, no rash, no recent travel, no sick contacts, no prior covid, no trauma, no etoh-dpt/ivdu.     cards: tacho tabor 76F former smoker (15py), L breast lumpectomy benign tumor, remote ectopic pregnancy s/p R salpingooophorectomy, hemorrhoidectomy, remote cholecystectomy, remote open appendectomy, ilocecectomy for adhesive sbo, chronic postop diarrhea on cholestyramine, last hospitalized for closed loop obstruction s/p ex lap (7/14/21), now c/o <24h nonradiating nonpositional nonpleuritic exertional ss chest pressure when walking back from PT session this afternoon, reproducible w/ walking -- last occurring when walking from cab to triage. recently switched from mtp to diltiazem for "hair loss" concern. placed on mtp initially because of "events" on loop recorder done <6mo ago. cardiac stress testing also reportedly wnl at that time. +positional lightheadedness since switching from mtp to diltiazem. at baseline just prior. no fever/chills, no uri/cough, no sob, no abd pain/n/v, no rash, no recent travel, no sick contacts, no prior covid, no trauma, no etoh-dpt/ivdu.     cards: tacho tabor

## 2022-03-22 NOTE — ED PROVIDER NOTE - PHYSICAL EXAMINATION
CONST: nontoxic NAD speaking in full sentences  HEAD: atraumatic  EYES: conjunctivae clear, PERRL, EOMI  ENT: mmm  NECK: supple/FROM  CARD: rrr no murmurs  CHEST: ctab no r/r/w  ABD: soft, nd, nttp, no rebound/guarding  EXT: FROM, symmetric distal pulses intact  SKIN: warm, dry, no rash, no pedal edema/ttp/rash, cap refill <2sec  NEURO: a+ox3, 5/5 strength x4, gross sensation intact x4, baseline gait

## 2022-03-22 NOTE — H&P ADULT - NS ATTEND AMEND GEN_ALL_CORE FT
The patient had a normal EKG stress test in December 2021.  She has new onset of chest pressure which is exertional.  She also has a soreness and tenderness which is different.  The troponins are normal.  The EKG is good.  The plan is CAT scan angiogram of the coronaries.  Continue diltiazem and hydrochlorothiazide.  Rx aspirin.  Discussed with PA.

## 2022-03-22 NOTE — H&P ADULT - NSICDXPASTMEDICALHX_GEN_ALL_CORE_FT
PAST MEDICAL HISTORY:  Benign tumor of breast, left     Chronic diarrhea     Ectopic pregnancy     SBO (small bowel obstruction)

## 2022-03-22 NOTE — H&P ADULT - HISTORY OF PRESENT ILLNESS
This is a 77 y/o female with multiple abdominal surgeries, SVT (on diltiazem) who presents to the ER with mid sternal chest pain that started during PT session. Pt states that during the session which was a little more strenuous than usual, she started experiencing chest achiness, relieved with rest. Pain rated 3/10, recurred with minimal exertion, associated with SOB and some palpitations  In the ER, VS with /81, HR 73, R 17, T 98.4, O2 sat 99% RA, labs with negative troponin and CPK, EKG negative, CXR negative. She is admitted for further management

## 2022-03-23 ENCOUNTER — TRANSCRIPTION ENCOUNTER (OUTPATIENT)
Age: 77
End: 2022-03-23

## 2022-03-23 DIAGNOSIS — I47.1 SUPRAVENTRICULAR TACHYCARDIA: ICD-10-CM

## 2022-03-23 DIAGNOSIS — R07.9 CHEST PAIN, UNSPECIFIED: ICD-10-CM

## 2022-03-23 LAB
ANION GAP SERPL CALC-SCNC: 10 MMOL/L — SIGNIFICANT CHANGE UP (ref 5–17)
BUN SERPL-MCNC: 17 MG/DL — SIGNIFICANT CHANGE UP (ref 7–23)
CALCIUM SERPL-MCNC: 9 MG/DL — SIGNIFICANT CHANGE UP (ref 8.4–10.5)
CHLORIDE SERPL-SCNC: 104 MMOL/L — SIGNIFICANT CHANGE UP (ref 96–108)
CO2 SERPL-SCNC: 27 MMOL/L — SIGNIFICANT CHANGE UP (ref 22–31)
CREAT SERPL-MCNC: 0.93 MG/DL — SIGNIFICANT CHANGE UP (ref 0.5–1.3)
EGFR: 63 ML/MIN/1.73M2 — SIGNIFICANT CHANGE UP
GLUCOSE SERPL-MCNC: 106 MG/DL — HIGH (ref 70–99)
HCT VFR BLD CALC: 36.9 % — SIGNIFICANT CHANGE UP (ref 34.5–45)
HGB BLD-MCNC: 11.9 G/DL — SIGNIFICANT CHANGE UP (ref 11.5–15.5)
MAGNESIUM SERPL-MCNC: 1.8 MG/DL — SIGNIFICANT CHANGE UP (ref 1.6–2.6)
MCHC RBC-ENTMCNC: 30.1 PG — SIGNIFICANT CHANGE UP (ref 27–34)
MCHC RBC-ENTMCNC: 32.2 GM/DL — SIGNIFICANT CHANGE UP (ref 32–36)
MCV RBC AUTO: 93.4 FL — SIGNIFICANT CHANGE UP (ref 80–100)
NRBC # BLD: 0 /100 WBCS — SIGNIFICANT CHANGE UP (ref 0–0)
PLATELET # BLD AUTO: 233 K/UL — SIGNIFICANT CHANGE UP (ref 150–400)
POTASSIUM SERPL-MCNC: 3.3 MMOL/L — LOW (ref 3.5–5.3)
POTASSIUM SERPL-SCNC: 3.3 MMOL/L — LOW (ref 3.5–5.3)
RBC # BLD: 3.95 M/UL — SIGNIFICANT CHANGE UP (ref 3.8–5.2)
RBC # FLD: 12.5 % — SIGNIFICANT CHANGE UP (ref 10.3–14.5)
SODIUM SERPL-SCNC: 141 MMOL/L — SIGNIFICANT CHANGE UP (ref 135–145)
TROPONIN T SERPL-MCNC: 0.01 NG/ML — SIGNIFICANT CHANGE UP (ref 0–0.01)
TROPONIN T SERPL-MCNC: 0.01 NG/ML — SIGNIFICANT CHANGE UP (ref 0–0.01)
WBC # BLD: 5.71 K/UL — SIGNIFICANT CHANGE UP (ref 3.8–10.5)
WBC # FLD AUTO: 5.71 K/UL — SIGNIFICANT CHANGE UP (ref 3.8–10.5)

## 2022-03-23 PROCEDURE — 93306 TTE W/DOPPLER COMPLETE: CPT | Mod: 26

## 2022-03-23 PROCEDURE — 75574 CT ANGIO HRT W/3D IMAGE: CPT | Mod: 26

## 2022-03-23 PROCEDURE — 99223 1ST HOSP IP/OBS HIGH 75: CPT

## 2022-03-23 RX ORDER — MAGNESIUM SULFATE 500 MG/ML
1 VIAL (ML) INJECTION ONCE
Refills: 0 | Status: COMPLETED | OUTPATIENT
Start: 2022-03-23 | End: 2022-03-23

## 2022-03-23 RX ORDER — ACETAMINOPHEN 500 MG
650 TABLET ORAL ONCE
Refills: 0 | Status: COMPLETED | OUTPATIENT
Start: 2022-03-23 | End: 2022-03-23

## 2022-03-23 RX ORDER — ASPIRIN/CALCIUM CARB/MAGNESIUM 324 MG
81 TABLET ORAL DAILY
Refills: 0 | Status: DISCONTINUED | OUTPATIENT
Start: 2022-03-23 | End: 2022-03-24

## 2022-03-23 RX ORDER — POTASSIUM CHLORIDE 20 MEQ
40 PACKET (EA) ORAL ONCE
Refills: 0 | Status: COMPLETED | OUTPATIENT
Start: 2022-03-23 | End: 2022-03-23

## 2022-03-23 RX ORDER — ENOXAPARIN SODIUM 100 MG/ML
40 INJECTION SUBCUTANEOUS EVERY 24 HOURS
Refills: 0 | Status: DISCONTINUED | OUTPATIENT
Start: 2022-03-23 | End: 2022-03-24

## 2022-03-23 RX ORDER — INFLUENZA VIRUS VACCINE 15; 15; 15; 15 UG/.5ML; UG/.5ML; UG/.5ML; UG/.5ML
0.7 SUSPENSION INTRAMUSCULAR ONCE
Refills: 0 | Status: COMPLETED | OUTPATIENT
Start: 2022-03-23 | End: 2022-03-23

## 2022-03-23 RX ORDER — HYDROCHLOROTHIAZIDE 25 MG
12.5 TABLET ORAL DAILY
Refills: 0 | Status: DISCONTINUED | OUTPATIENT
Start: 2022-03-23 | End: 2022-03-24

## 2022-03-23 RX ORDER — CHOLESTYRAMINE 4 G/9G
1 POWDER, FOR SUSPENSION ORAL
Qty: 0 | Refills: 0 | DISCHARGE

## 2022-03-23 RX ORDER — POTASSIUM CHLORIDE 20 MEQ
40 PACKET (EA) ORAL EVERY 4 HOURS
Refills: 0 | Status: COMPLETED | OUTPATIENT
Start: 2022-03-23 | End: 2022-03-23

## 2022-03-23 RX ORDER — DILTIAZEM HCL 120 MG
120 CAPSULE, EXT RELEASE 24 HR ORAL DAILY
Refills: 0 | Status: DISCONTINUED | OUTPATIENT
Start: 2022-03-23 | End: 2022-03-24

## 2022-03-23 RX ADMIN — Medication 40 MILLIEQUIVALENT(S): at 04:21

## 2022-03-23 RX ADMIN — Medication 100 GRAM(S): at 10:59

## 2022-03-23 RX ADMIN — Medication 81 MILLIGRAM(S): at 22:07

## 2022-03-23 RX ADMIN — Medication 40 MILLIEQUIVALENT(S): at 10:58

## 2022-03-23 RX ADMIN — ENOXAPARIN SODIUM 40 MILLIGRAM(S): 100 INJECTION SUBCUTANEOUS at 22:08

## 2022-03-23 RX ADMIN — Medication 12.5 MILLIGRAM(S): at 22:06

## 2022-03-23 RX ADMIN — Medication 650 MILLIGRAM(S): at 15:04

## 2022-03-23 RX ADMIN — Medication 650 MILLIGRAM(S): at 14:04

## 2022-03-23 RX ADMIN — ENOXAPARIN SODIUM 40 MILLIGRAM(S): 100 INJECTION SUBCUTANEOUS at 01:06

## 2022-03-23 RX ADMIN — Medication 120 MILLIGRAM(S): at 15:55

## 2022-03-23 NOTE — DISCHARGE NOTE PROVIDER - CARE PROVIDER_API CALL
Patricia Nevarez)  Cardiovascular Disease; Internal Medicine  [address], [address] [place] [address]  Phone: [phone]  Fax: [phone]  Follow Up Time:

## 2022-03-23 NOTE — DISCHARGE NOTE PROVIDER - NSDCPNSUBOBJ_GEN_ALL_CORE
The patient has no further chest pain.  She has been and active in the hospital.  The CAT scan angiogram shows no significant obstruction but there are 2 areas that were not well seen because of movement.  The stress echocardiogram is normal.  Significant coronary artery disease unlikely.  The patient will be discharged home and return to the office in 2 to 3 weeks.  This was discussed with the physician's assistant.

## 2022-03-23 NOTE — DISCHARGE NOTE PROVIDER - NSDCMRMEDTOKEN_GEN_ALL_CORE_FT
cholestyramine 4 g/5 g oral powder for reconstitution: 1 application orally 2 times a day  dilTIAZem 24 hour extended release:    cholestyramine 4 g/5 g oral powder for reconstitution: 1 application orally 2 times a day  hydroCHLOROthiazide 25 mg oral tablet: 1 tab(s) orally once a day

## 2022-03-23 NOTE — PROGRESS NOTE ADULT - PROBLEM SELECTOR PLAN 1
Trop T neg X 3  ECHO 3/23/22: mild to mod MR, EF 65 %. Compared to the previous TTE performed on 5/10/2018, the degree of mitral regurgitation has slightly progressed.  CCTA 3/23/22  NPO after MN for stress ECHO  c/w ASA 81 mg daily  - c/w home HCTZ 25 mg daily,

## 2022-03-23 NOTE — DISCHARGE NOTE PROVIDER - NSDCFUADDAPPT_GEN_ALL_CORE_FT
Please follow up with Dr. Nevarez in 1-2 week. Please call his office and make an appointment to see him.

## 2022-03-23 NOTE — PROGRESS NOTE ADULT - SUBJECTIVE AND OBJECTIVE BOX
Interventional Cardiology PA Adult Progress Note    CC: chest pain   Subjective Assessment: Pt. seen and examined at bedside today am. Pt. comfortable; report mid-sternal rib pain worse on palpation, denies any SOB, dizziness, palpitation, N/V, LE edmea, PND/orthopnea.     ROS neg except as per subjective HPI.   	  MEDICATIONS:  diltiazem    milliGRAM(s) Oral daily  hydrochlorothiazide 12.5 milliGRAM(s) Oral daily  aspirin enteric coated 81 milliGRAM(s) Oral daily  enoxaparin Injectable 40 milliGRAM(s) SubCutaneous every 24 hours  influenza  Vaccine (HIGH DOSE) 0.7 milliLiter(s) IntraMuscular once      	    [PHYSICAL EXAM:  TELEMETRY:  T(C): 36.9 (03-23-22 @ 17:52), Max: 36.9 (03-22-22 @ 19:16)  HR: 59 (03-23-22 @ 15:56) (55 - 79)  BP: 124/68 (03-23-22 @ 15:56) (106/69 - 136/77)  RR: 16 (03-23-22 @ 15:56) (16 - 18)  SpO2: 99% (03-23-22 @ 15:56) (95% - 99%)  Wt(kg): --  I&O's Summary    23 Mar 2022 07:01  -  23 Mar 2022 17:58  --------------------------------------------------------  IN: 100 mL / OUT: 0 mL / NET: 100 mL      Height (cm): 165.1 (03-22 @ 19:16)  Weight (kg): 55.8 (03-22 @ 19:16)  BMI (kg/m2): 20.5 (03-22 @ 19:16)  BSA (m2): 1.61 (03-22 @ 19:16)  Enriquez:  Central/PICC/Mid Line:                                         Gen: NAD  Neck; no JVD  CV: RRR, s1 and s2 positive, no murmurs noted  Pulm: CTA B/L; no w/r/r  GI: soft, NT/ND, + BS X 4  extremities: no clubbing, cyanosis and edema noted b/l  Neuro: AO x 3; no focal deficits appreciated      	    ECG:  	  RADIOLOGY:   DIAGNOSTIC TESTING:  [ ] Echocardiogram:  [ ]  Catheterization:  [ ] Stress Test:    [ ] CM  OTHER: 	    LABS:	 	  CARDIAC MARKERS:                                  11.9   5.71  )-----------( 233      ( 23 Mar 2022 07:41 )             36.9     03-23    141  |  104  |  17  ----------------------------<  106<H>  3.3<L>   |  27  |  0.93    Ca    9.0      23 Mar 2022 01:29  Mg     1.8     03-23    TPro  6.6  /  Alb  4.1  /  TBili  0.4  /  DBili  x   /  AST  20  /  ALT  22  /  AlkPhos  79  03-22    proBNP: Serum Pro-Brain Natriuretic Peptide: 67 pg/mL (03-22 @ 21:44)    Lipid Profile:   HgA1c:   TSH:   PT/INR - ( 22 Mar 2022 21:44 )   PT: 10.9 sec;   INR: 0.92          PTT - ( 22 Mar 2022 21:44 )  PTT:28.2 sec    ASSESSMENT/PLAN: 	        DVT ppx:  Dispo:

## 2022-03-23 NOTE — DISCHARGE NOTE PROVIDER - HOSPITAL COURSE
77 y/o female with multiple abdominal surgeries, SVT (on diltiazem) who presents to the ER with mid sternal chest pain that started during PT session. Pt states that during the session which was a little more strenuous than usual, she started experiencing chest achiness, relieved with rest. Pain rated 3/10, recurred with minimal exertion, associated with SOB and some palpitations.  In the ER, VS with /81, HR 73, R 17, T 98.4, O2 sat 99% RA, labs with negative troponin and CPK, EKG negative, CXR negative. She is admitted for further management. ECHO 3/23/22: mild to mod MR, EF 65 %. Compared to the previous TTE performed on 5/10/2018, the degree of mitral regurgitation has slightly progressed. CCTA 3/23/22The calcium score is 101 Agatston units, which is at the 56th percentile, adjusted for age, gender and race. Cannot exclude significant stenosis of the RPDA Probably normal RPL, mid LCx and OM branch Non-obstructive disease in remaining coronary segments  Exercise Stress ECHO 3/24/22:  ___________________.   Please continue your home diltiazem 120 mg QD, HCTZ 12.5 mg QD. and aspirin 81 mg qd.   Pt given appropriate d/c instructions and verbalized understanding. Medications sent to preferred pharmacy. Pt deemed stable for d/c per Dr. Nevarez and will f/u with Dr. Nevarez in 1-2 weeks.         75 y/o female with multiple abdominal surgeries, SVT (on diltiazem) who presents to the ER with mid sternal chest pain that started during PT session. Pt states that during the session which was a little more strenuous than usual, she started experiencing chest achiness, relieved with rest. Pain rated 3/10, recurred with minimal exertion, associated with SOB and some palpitations.  In the ER, VS with /81, HR 73, R 17, T 98.4, O2 sat 99% RA, labs with negative troponin and CPK, EKG negative, CXR negative. She is admitted for further management. ECHO 3/23/22: mild to mod MR, EF 65 %. Compared to the previous TTE performed on 5/10/2018, the degree of mitral regurgitation has slightly progressed. CCTA 3/23/22The calcium score is 101 Agatston units, which is at the 56th percentile, adjusted for age, gender and race. Cannot exclude significant stenosis of the RPDA Probably normal RPL, mid LCx and OM branch Non-obstructive disease in remaining coronary segments. Exercise Stress ECHO 3/24/22: normal stress echo, no evidence of exercise induced ischemia. Normal exercise EKG, no evidence of exercise ischemia at or near maximal predicted heart rate. No exercise induced ischemia.  Pt endorsing feeling lightheaded with Diltiazem and as discussed with Dr Nevarez, pt can dc diltiazem and monitor for further episodes of SVT or palpitations.  Please continue your home HCTZ 12.5 mg QD. and aspirin 81 mg qd. Pt given appropriate d/c instructions and verbalized understanding. Medications sent to preferred pharmacy. Pt deemed stable for d/c per Dr. Nevarez and will f/u with Dr. Nevarez in 1-2 weeks.

## 2022-03-23 NOTE — DISCHARGE NOTE PROVIDER - NSDCCPCAREPLAN_GEN_ALL_CORE_FT
PRINCIPAL DISCHARGE DIAGNOSIS  Diagnosis: Exertional chest pain  Assessment and Plan of Treatment: You came to the hospital with chest pain. we did blood work which is negative for heart attack; we did an ultrasoundof your heart and CT scan ............  Please follow up with your cardiologist Dr. Nevarez in 1-2 week. Please call his office and make an appointment to see him.      SECONDARY DISCHARGE DIAGNOSES  Diagnosis: Paroxysmal SVT (supraventricular tachycardia)  Assessment and Plan of Treatment: Please continue to take home Diltiazem 120 mg daily,    Diagnosis: HTN (hypertension)  Assessment and Plan of Treatment: Please continue to take home Hydrochlorthiazzide 25 mg daily.     PRINCIPAL DISCHARGE DIAGNOSIS  Diagnosis: Exertional chest pain  Assessment and Plan of Treatment: You came to the hospital with chest pain. We did blood work which is negative for heart attack; we did an ultrasound your heart (Echocardiogram) which showed normal pumping function of your heart. The Stress echocardiogrm showed ______________  Please follow up with your cardiologist Dr. Nevarez in 1-2 week. Please call his office and make an appointment to see him.      SECONDARY DISCHARGE DIAGNOSES  Diagnosis: Paroxysmal SVT (supraventricular tachycardia)  Assessment and Plan of Treatment: Please continue to take home Diltiazem 120 mg daily,    Diagnosis: HTN (hypertension)  Assessment and Plan of Treatment: Please continue your home Hydrochlorthiazide (HCTZ) 25 mg daily.     PRINCIPAL DISCHARGE DIAGNOSIS  Diagnosis: Exertional chest pain  Assessment and Plan of Treatment: You came to the hospital with chest pain. We did blood work which is negative for heart attack; we did an ultrasound your heart (Echocardiogram) which showed normal pumping function of your heart. The Stress echocardiogrm was normal.   Please follow up with your cardiologist Dr. Nevarez in 2-3 week. Please call his office and make an appointment to see him.      SECONDARY DISCHARGE DIAGNOSES  Diagnosis: Paroxysmal SVT (supraventricular tachycardia)  Assessment and Plan of Treatment: Please stop your home diltiazem 120 mg daily. Please continue to monitor your heart rate for SVT or feelings of palpitations. Please follow up with Dr Nevarez in 2-3 weeks.    Diagnosis: HTN (hypertension)  Assessment and Plan of Treatment: Please continue your home Hydrochlorthiazide (HCTZ) 25 mg daily.

## 2022-03-23 NOTE — PROGRESS NOTE ADULT - PROBLEM SELECTOR PLAN 2
Continue Diltiazem 120 mg daily   Telemetry    DVT PPX: lovenox  dispo: NPO for stress ECHO    Case d/w Dr. Nevarez

## 2022-03-23 NOTE — PROGRESS NOTE ADULT - ASSESSMENT
77 y/o F with SVT--on diltiazem, multiple abdominal surgeries admitted with exertional chest pain.     Home meds verified: · cholestyramine 4 g/5 g oral powder for reconstitution: Last Dose Taken:  , 1 application orally 2 times a day, HCTZ 25 mg daily, Diltiazem 120 mg daily.

## 2022-03-24 ENCOUNTER — TRANSCRIPTION ENCOUNTER (OUTPATIENT)
Age: 77
End: 2022-03-24

## 2022-03-24 VITALS — TEMPERATURE: 98 F

## 2022-03-24 LAB
A1C WITH ESTIMATED AVERAGE GLUCOSE RESULT: 5.7 % — HIGH (ref 4–5.6)
ANION GAP SERPL CALC-SCNC: 9 MMOL/L — SIGNIFICANT CHANGE UP (ref 5–17)
BUN SERPL-MCNC: 16 MG/DL — SIGNIFICANT CHANGE UP (ref 7–23)
CALCIUM SERPL-MCNC: 9.1 MG/DL — SIGNIFICANT CHANGE UP (ref 8.4–10.5)
CHLORIDE SERPL-SCNC: 104 MMOL/L — SIGNIFICANT CHANGE UP (ref 96–108)
CHOLEST SERPL-MCNC: 201 MG/DL — HIGH
CO2 SERPL-SCNC: 25 MMOL/L — SIGNIFICANT CHANGE UP (ref 22–31)
CREAT SERPL-MCNC: 0.73 MG/DL — SIGNIFICANT CHANGE UP (ref 0.5–1.3)
EGFR: 85 ML/MIN/1.73M2 — SIGNIFICANT CHANGE UP
ESTIMATED AVERAGE GLUCOSE: 117 MG/DL — HIGH (ref 68–114)
GLUCOSE SERPL-MCNC: 103 MG/DL — HIGH (ref 70–99)
HCT VFR BLD CALC: 39.9 % — SIGNIFICANT CHANGE UP (ref 34.5–45)
HDLC SERPL-MCNC: 93 MG/DL — SIGNIFICANT CHANGE UP
HGB BLD-MCNC: 12.9 G/DL — SIGNIFICANT CHANGE UP (ref 11.5–15.5)
LIPID PNL WITH DIRECT LDL SERPL: 70 MG/DL — SIGNIFICANT CHANGE UP
MAGNESIUM SERPL-MCNC: 2.2 MG/DL — SIGNIFICANT CHANGE UP (ref 1.6–2.6)
MCHC RBC-ENTMCNC: 30.6 PG — SIGNIFICANT CHANGE UP (ref 27–34)
MCHC RBC-ENTMCNC: 32.3 GM/DL — SIGNIFICANT CHANGE UP (ref 32–36)
MCV RBC AUTO: 94.8 FL — SIGNIFICANT CHANGE UP (ref 80–100)
NON HDL CHOLESTEROL: 108 MG/DL — SIGNIFICANT CHANGE UP
NRBC # BLD: 0 /100 WBCS — SIGNIFICANT CHANGE UP (ref 0–0)
PLATELET # BLD AUTO: 254 K/UL — SIGNIFICANT CHANGE UP (ref 150–400)
POTASSIUM SERPL-MCNC: 4 MMOL/L — SIGNIFICANT CHANGE UP (ref 3.5–5.3)
POTASSIUM SERPL-SCNC: 4 MMOL/L — SIGNIFICANT CHANGE UP (ref 3.5–5.3)
RBC # BLD: 4.21 M/UL — SIGNIFICANT CHANGE UP (ref 3.8–5.2)
RBC # FLD: 12.6 % — SIGNIFICANT CHANGE UP (ref 10.3–14.5)
SODIUM SERPL-SCNC: 138 MMOL/L — SIGNIFICANT CHANGE UP (ref 135–145)
TRIGL SERPL-MCNC: 189 MG/DL — HIGH
TSH SERPL-MCNC: 5.19 UIU/ML — HIGH (ref 0.27–4.2)
WBC # BLD: 6.45 K/UL — SIGNIFICANT CHANGE UP (ref 3.8–10.5)
WBC # FLD AUTO: 6.45 K/UL — SIGNIFICANT CHANGE UP (ref 3.8–10.5)

## 2022-03-24 PROCEDURE — 93351 STRESS TTE COMPLETE: CPT

## 2022-03-24 PROCEDURE — C8929: CPT

## 2022-03-24 PROCEDURE — 85025 COMPLETE CBC W/AUTO DIFF WBC: CPT

## 2022-03-24 PROCEDURE — 85730 THROMBOPLASTIN TIME PARTIAL: CPT

## 2022-03-24 PROCEDURE — 93351 STRESS TTE COMPLETE: CPT | Mod: 26,52

## 2022-03-24 PROCEDURE — 85610 PROTHROMBIN TIME: CPT

## 2022-03-24 PROCEDURE — 84484 ASSAY OF TROPONIN QUANT: CPT

## 2022-03-24 PROCEDURE — 82550 ASSAY OF CK (CPK): CPT

## 2022-03-24 PROCEDURE — 83036 HEMOGLOBIN GLYCOSYLATED A1C: CPT

## 2022-03-24 PROCEDURE — 83880 ASSAY OF NATRIURETIC PEPTIDE: CPT

## 2022-03-24 PROCEDURE — 82553 CREATINE MB FRACTION: CPT

## 2022-03-24 PROCEDURE — 71045 X-RAY EXAM CHEST 1 VIEW: CPT

## 2022-03-24 PROCEDURE — 99285 EMERGENCY DEPT VISIT HI MDM: CPT | Mod: 25

## 2022-03-24 PROCEDURE — 83735 ASSAY OF MAGNESIUM: CPT

## 2022-03-24 PROCEDURE — 36415 COLL VENOUS BLD VENIPUNCTURE: CPT

## 2022-03-24 PROCEDURE — 80053 COMPREHEN METABOLIC PANEL: CPT

## 2022-03-24 PROCEDURE — 80061 LIPID PANEL: CPT

## 2022-03-24 PROCEDURE — 80048 BASIC METABOLIC PNL TOTAL CA: CPT

## 2022-03-24 PROCEDURE — 85027 COMPLETE CBC AUTOMATED: CPT

## 2022-03-24 PROCEDURE — 87635 SARS-COV-2 COVID-19 AMP PRB: CPT

## 2022-03-24 PROCEDURE — 84443 ASSAY THYROID STIM HORMONE: CPT

## 2022-03-24 PROCEDURE — 99239 HOSP IP/OBS DSCHRG MGMT >30: CPT

## 2022-03-24 PROCEDURE — 75574 CT ANGIO HRT W/3D IMAGE: CPT

## 2022-03-24 RX ORDER — DILTIAZEM HCL 120 MG
0 CAPSULE, EXT RELEASE 24 HR ORAL
Qty: 0 | Refills: 0 | DISCHARGE

## 2022-03-24 RX ADMIN — Medication 120 MILLIGRAM(S): at 05:25

## 2022-03-24 NOTE — DISCHARGE NOTE NURSING/CASE MANAGEMENT/SOCIAL WORK - PATIENT PORTAL LINK FT
You can access the FollowMyHealth Patient Portal offered by [facility] by registering at the following website: [email or web address]. By joining [employer]’s FollowMyHealth portal, you will also be able to view your health information using other applications (apps) compatible with our system.

## 2022-03-24 NOTE — DIETITIAN INITIAL EVALUATION ADULT. - OTHER CALCULATIONS
%IBW=98; current body wt used for energy calculations as pt falls within % IBW; adjusted for age and current conditions

## 2022-03-24 NOTE — DISCHARGE NOTE NURSING/CASE MANAGEMENT/SOCIAL WORK - NSDCPEFALRISK_GEN_ALL_CORE
For information on Fall & Injury Prevention, visit: [email or web address] OR  [email or web address] OR  https://www.cdc.gov/steadi/patient.html

## 2022-03-24 NOTE — DIETITIAN INITIAL EVALUATION ADULT. - OTHER INFO
77 y/o female with multiple abdominal surgeries, SBO, Chronic diarrhea, SVT (on diltiazem) who presents to the ER with mid sternal chest pain that started during PT session. Pt Pending stress ECHO today of which she was NPO@12 for. Reports PTA good PO intake, 3 meals/day, seems to be on a regular diet however stays away from fats/oils d/t reports GI distress. NKFA. No issues chewing/swallowing. No pain. Huan 21. No edema/pressure ulcers. +BM3/23.   Please see RD Recs below.

## 2022-03-24 NOTE — DISCHARGE NOTE NURSING/CASE MANAGEMENT/SOCIAL WORK - NSDCVIVACCINE_GEN_ALL_CORE_FT
Tdap; 20-Apr-2019 19:38; Radha Bazan (RN); [employer]; [id number] (Exp. Date: 26-Feb-2021); IntraMuscular; Deltoid Left.; 0.5 milliLiter(s); VIS (VIS Published: 09-May-2013, VIS Presented: 20-Apr-2019);

## 2022-03-24 NOTE — DIETITIAN INITIAL EVALUATION ADULT. - PERTINENT MEDS FT
MEDICATIONS  (STANDING):  aspirin enteric coated 81 milliGRAM(s) Oral daily  diltiazem    milliGRAM(s) Oral daily  enoxaparin Injectable 40 milliGRAM(s) SubCutaneous every 24 hours  hydrochlorothiazide 12.5 milliGRAM(s) Oral daily  influenza  Vaccine (HIGH DOSE) 0.7 milliLiter(s) IntraMuscular once

## 2022-03-28 DIAGNOSIS — R07.9 CHEST PAIN, UNSPECIFIED: ICD-10-CM

## 2022-03-28 DIAGNOSIS — Z88.8 ALLERGY STATUS TO OTHER DRUGS, MEDICAMENTS AND BIOLOGICAL SUBSTANCES STATUS: ICD-10-CM

## 2022-03-28 DIAGNOSIS — I10 ESSENTIAL (PRIMARY) HYPERTENSION: ICD-10-CM

## 2022-03-28 DIAGNOSIS — Z79.01 LONG TERM (CURRENT) USE OF ANTICOAGULANTS: ICD-10-CM

## 2022-03-28 DIAGNOSIS — Z79.82 LONG TERM (CURRENT) USE OF ASPIRIN: ICD-10-CM

## 2022-03-28 DIAGNOSIS — I47.1 SUPRAVENTRICULAR TACHYCARDIA: ICD-10-CM

## 2022-03-28 DIAGNOSIS — Z82.49 FAMILY HISTORY OF ISCHEMIC HEART DISEASE AND OTHER DISEASES OF THE CIRCULATORY SYSTEM: ICD-10-CM

## 2022-03-28 DIAGNOSIS — Z90.49 ACQUIRED ABSENCE OF OTHER SPECIFIED PARTS OF DIGESTIVE TRACT: ICD-10-CM

## 2022-04-22 PROBLEM — O00.90 UNSPECIFIED ECTOPIC PREGNANCY WITHOUT INTRAUTERINE PREGNANCY: Chronic | Status: ACTIVE | Noted: 2022-03-23

## 2022-04-22 PROBLEM — K56.609 UNSPECIFIED INTESTINAL OBSTRUCTION, UNSPECIFIED AS TO PARTIAL VERSUS COMPLETE OBSTRUCTION: Chronic | Status: ACTIVE | Noted: 2022-03-23

## 2022-04-22 PROBLEM — D24.2 BENIGN NEOPLASM OF LEFT BREAST: Chronic | Status: ACTIVE | Noted: 2022-03-23

## 2022-05-02 ENCOUNTER — NON-APPOINTMENT (OUTPATIENT)
Age: 77
End: 2022-05-02

## 2022-05-02 ENCOUNTER — APPOINTMENT (OUTPATIENT)
Dept: HEART AND VASCULAR | Facility: CLINIC | Age: 77
End: 2022-05-02
Payer: MEDICARE

## 2022-05-02 VITALS
HEART RATE: 73 BPM | TEMPERATURE: 98.2 F | SYSTOLIC BLOOD PRESSURE: 100 MMHG | BODY MASS INDEX: 20.49 KG/M2 | HEIGHT: 65 IN | DIASTOLIC BLOOD PRESSURE: 62 MMHG | OXYGEN SATURATION: 95 % | WEIGHT: 123 LBS

## 2022-05-02 DIAGNOSIS — R55 SYNCOPE AND COLLAPSE: ICD-10-CM

## 2022-05-02 DIAGNOSIS — Z86.79 PERSONAL HISTORY OF OTHER DISEASES OF THE CIRCULATORY SYSTEM: ICD-10-CM

## 2022-05-02 DIAGNOSIS — F07.81 POSTCONCUSSIONAL SYNDROME: ICD-10-CM

## 2022-05-02 PROCEDURE — 93000 ELECTROCARDIOGRAM COMPLETE: CPT

## 2022-05-02 PROCEDURE — 99215 OFFICE O/P EST HI 40 MIN: CPT | Mod: 25

## 2022-05-03 PROBLEM — Z86.79 HISTORY OF ABNORMAL ELECTROCARDIOGRAPHY: Status: RESOLVED | Noted: 2021-10-02 | Resolved: 2022-05-03

## 2022-05-03 PROBLEM — R55 SYNCOPE AND COLLAPSE: Status: ACTIVE | Noted: 2022-05-03

## 2022-05-03 PROBLEM — F07.81 CONCUSSION SYNDROME: Status: ACTIVE | Noted: 2022-05-03

## 2022-05-03 NOTE — DISCUSSION/SUMMARY
[FreeTextEntry1] : The patient fell hit her head and has a change in mental status.  She is referred to the emergency room for CAT scan.  It was recommended that she rest both physically and mentally.  The EKG done for dizziness shows an RSR prime in V1.  Her syncope was caused by her gastroenteritis which has resolved.  Arrhythmia is unlikely.  Her blood pressure is low.  She will continue to hold her blood pressure medication. 97.8

## 2022-05-03 NOTE — HISTORY OF PRESENT ILLNESS
[FreeTextEntry1] : The patient had vomiting and diarrhea and awakened on the floor.  She had bleeding from a cut.  She has not had a headache afterwards.  She has been forgetful and has been unable to find objects at home.  She denies nausea after events.  This occurred a less than a week ago but the patient is unsure of the timing.  She has occasional palpitations in bed which last for 2 seconds.  She has dizziness upon arising.  She stopped diltiazem at her last visit.  She stopped her hydrochlorothiazide after the fall.

## 2022-05-03 NOTE — PHYSICAL EXAM

## 2022-06-08 ENCOUNTER — RX RENEWAL (OUTPATIENT)
Age: 77
End: 2022-06-08

## 2022-06-20 ENCOUNTER — APPOINTMENT (OUTPATIENT)
Dept: HEART AND VASCULAR | Facility: CLINIC | Age: 77
End: 2022-06-20

## 2022-10-31 NOTE — H&P ADULT - NSHPSOCIALHISTORY_GEN_ALL_CORE
Orthopaedic Hand Clinic Note    Name: Jose Maria Lund  YOB: 1947  Gender: female  MRN: [id number]      Follow up visit:   1. 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 female who is following up for right index finger pain and swelling. She says that the redness resolved after she was given the prescription for steroids. She still has swelling and pain when she moves the finger. She was sent back by her rheumatologist she says either to have the finger drained or cut so that that material can be sent to the lab. .      ROS/Meds/PSH/PMH/FH/SH: I personally reviewed the patients standard intake form. Pertinents are discussed in the HPI    Physical Examination:    Musculoskeletal Examination:  Examination on the right upper extremity demonstrates cap refill < 5 seconds in all fingers, there is swelling at the index DIP, no erythema or fluctuance. There is tenderness to palpation at the DIP, and pain with DIP range of motion . Imaging / Electrodiagnostic Tests:     Finger XR: AP, Lateral, Oblique views     Clinical Indication:  1. 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  We discussed observation, therapy, antiinflammatory medications and other pertinent treatment modalities. I explained that arthrocentesis of the distal interphalangeal joint is not possible as the joint is too small to obtain adequate fluid sample. Explained that I do not perform excision of gouty tophi in the office, that this would be a surgical procedure. I discussed that even if I remove the material on the dorsal aspect of the distal interphalangeal joint, she would likely continue to have distal interphalangeal joint pain, as a result of the underlying arthritis. As a result, she wishes to proceed with DIP arthrodesis. Patient understands risks and benefits of right index finger distal interphalangeal joint arthrodesis including but not limited to nerve injury, vessel injury, infection, failure to achieve desired results and possible need for additional surgery. Patient understands and wishes to proceed with surgery. On Exam:   The patient is alert and oriented  Cardiovascular: regular rate and rhythm  Respiratory: Non labored breathing      Patient voiced accordance and understanding of the information provided and the formulated plan. All questions were answered to the patient's satisfaction during the encounter.       Laura Portillo MD  Orthopaedic Surgery  10/31/22  4:42 PM +ETOH 2x glasses wine/day, nonsmoker, no illicit drugs, works in real estate, lives in [place] with

## 2022-11-03 ENCOUNTER — APPOINTMENT (OUTPATIENT)
Dept: HEART AND VASCULAR | Facility: CLINIC | Age: 77
End: 2022-11-03

## 2022-11-03 VITALS
DIASTOLIC BLOOD PRESSURE: 75 MMHG | HEART RATE: 78 BPM | SYSTOLIC BLOOD PRESSURE: 123 MMHG | OXYGEN SATURATION: 96 % | BODY MASS INDEX: 20.99 KG/M2 | HEIGHT: 65 IN | WEIGHT: 126 LBS

## 2022-11-03 DIAGNOSIS — Z86.79 PERSONAL HISTORY OF OTHER DISEASES OF THE CIRCULATORY SYSTEM: ICD-10-CM

## 2022-11-03 PROCEDURE — 93000 ELECTROCARDIOGRAM COMPLETE: CPT

## 2022-11-03 PROCEDURE — 99214 OFFICE O/P EST MOD 30 MIN: CPT

## 2022-11-03 RX ORDER — DILTIAZEM HYDROCHLORIDE 120 MG/1
120 CAPSULE, EXTENDED RELEASE ORAL
Qty: 90 | Refills: 1 | Status: COMPLETED | COMMUNITY
Start: 2022-03-08 | End: 2022-11-03

## 2022-11-03 RX ORDER — AMOXICILLIN 500 MG/1
500 CAPSULE ORAL
Qty: 4 | Refills: 0 | Status: COMPLETED | COMMUNITY
Start: 2022-09-04

## 2022-11-03 RX ORDER — CHOLESTYRAMINE 4 G/5.5G
4 POWDER, FOR SUSPENSION ORAL
Qty: 718 | Refills: 0 | Status: COMPLETED | COMMUNITY
Start: 2022-09-28

## 2022-11-03 RX ORDER — AMOXICILLIN 500 MG/1
500 TABLET, FILM COATED ORAL
Qty: 21 | Refills: 0 | Status: COMPLETED | COMMUNITY
Start: 2022-06-15

## 2022-11-06 ENCOUNTER — NON-APPOINTMENT (OUTPATIENT)
Age: 77
End: 2022-11-06

## 2022-11-06 NOTE — HISTORY OF PRESENT ILLNESS
[FreeTextEntry1] : 76 year old female, non obstructive CAD ( CCTA 3/2022 ) SVT ( on Holter 9/2021 ) and Varicose Veins here for follow up. \par \par Patient is concerned for leg swelling for the past few weeks. Patient always had varicose veins however were not symptomatic until recently. Patient has been moving homes and has noted swelling with aches and fatigue of her legs with Right > Left. She denies any worsening dyspnea on exertion, orthopnea, pnd or cough. \par \par She remains to have occasional palpitations at rest, no change. Symptoms are far in between and lasting < 2 seconds. She reports no associated symptoms. She reports some dizziness if she stands too fast, otherwise no associated dizziness, falls, syncope or neuro focal deficits.  \par

## 2022-11-06 NOTE — DISCUSSION/SUMMARY
[FreeTextEntry1] : 76 year old female, non obstructive CAD ( CCTA 3/2022 ) SVT ( on Holter 9/2021 ) and Varicose Veins here for follow up. \par \par SVT: Holter ( 9/2021 ) 13bt SVT at 124 bpm and 23 at 108.Continued palpitations on occasion which are short lived and with no associated symptoms. No change. \par FIGUEROA: With carrying heavy objects. Recent CCTA (3/23/22) revealed score of 101, placing  in 56%. Unable to see pRPDA, remaining vessels non obstructive. Stress ECHO (3/23/22) Exercised for 6:41 min, normal. Significant CAD not likely\par Varicose Veins: New discomfort. Will have her return for venous ultrasound.\par \par \par I, Dr. Patricia Nevarez personally performed the evaluation and management services for this patient who presents today with a changed problem.  The evaluation and management includes conducting the examination assessing all conditions and establishing a new plan of care.  Today my ACP Alissa Tariq was here and recorded the evaluation and management services.                                                                        \par  [EKG obtained to assist in diagnosis and management of assessed problem(s)] : EKG obtained to assist in diagnosis and management of assessed problem(s)

## 2022-11-06 NOTE — PHYSICAL EXAM
[Well Developed] : well developed [Well Nourished] : well nourished [No Acute Distress] : no acute distress [Normal S1, S2] : normal S1, S2 [No Murmur] : no murmur [Clear Lung Fields] : clear lung fields [Good Air Entry] : good air entry [No Respiratory Distress] : no respiratory distress  [Normal Gait] : normal gait [Venous varicosities] : venous varicosities [Moves all extremities] : moves all extremities [No Focal Deficits] : no focal deficits [Normal Speech] : normal speech [Alert and Oriented] : alert and oriented [Normal memory] : normal memory [Edema ___] : edema [unfilled] [de-identified] : non pitting edema

## 2022-11-06 NOTE — REVIEW OF SYSTEMS
[Dyspnea on exertion] : dyspnea during exertion [Lower Ext Edema] : lower extremity edema [Dizziness] : dizziness [Fever] : no fever [Chills] : no chills [SOB] : no shortness of breath [Chest Discomfort] : no chest discomfort [Leg Claudication] : no intermittent leg claudication [Palpitations] : no palpitations [Orthopnea] : no orthopnea [Syncope] : no syncope [Nausea] : no nausea [Vomiting] : no vomiting [Diarrhea] : diarrhea [Numbness (Hypoesthesia)] : no numbness [Weakness] : no weakness [Speech Disturbance] : no speech disturbance [Easy Bleeding] : no tendency for easy bleeding [de-identified] : if stands too fast

## 2022-11-22 ENCOUNTER — APPOINTMENT (OUTPATIENT)
Dept: HEART AND VASCULAR | Facility: CLINIC | Age: 77
End: 2022-11-22

## 2022-11-22 PROCEDURE — 93970 EXTREMITY STUDY: CPT

## 2022-12-22 ENCOUNTER — APPOINTMENT (OUTPATIENT)
Dept: HEART AND VASCULAR | Facility: CLINIC | Age: 77
End: 2022-12-22
Payer: MEDICARE

## 2022-12-22 VITALS
BODY MASS INDEX: 20.83 KG/M2 | TEMPERATURE: 98.6 F | HEIGHT: 65 IN | WEIGHT: 125 LBS | DIASTOLIC BLOOD PRESSURE: 78 MMHG | HEART RATE: 80 BPM | SYSTOLIC BLOOD PRESSURE: 130 MMHG

## 2022-12-22 PROCEDURE — 99215 OFFICE O/P EST HI 40 MIN: CPT | Mod: 25

## 2022-12-22 PROCEDURE — 93970 EXTREMITY STUDY: CPT

## 2022-12-22 PROCEDURE — 93971 EXTREMITY STUDY: CPT | Mod: RT,59

## 2023-02-22 ENCOUNTER — APPOINTMENT (OUTPATIENT)
Dept: HEART AND VASCULAR | Facility: CLINIC | Age: 78
End: 2023-02-22
Payer: MEDICARE

## 2023-02-22 VITALS
DIASTOLIC BLOOD PRESSURE: 74 MMHG | WEIGHT: 126 LBS | TEMPERATURE: 98.6 F | SYSTOLIC BLOOD PRESSURE: 124 MMHG | BODY MASS INDEX: 20.99 KG/M2 | HEART RATE: 80 BPM | HEIGHT: 65 IN

## 2023-02-22 PROCEDURE — 36475 ENDOVENOUS RF 1ST VEIN: CPT | Mod: RT

## 2023-02-24 ENCOUNTER — APPOINTMENT (OUTPATIENT)
Dept: HEART AND VASCULAR | Facility: CLINIC | Age: 78
End: 2023-02-24
Payer: MEDICARE

## 2023-02-24 PROCEDURE — 93971 EXTREMITY STUDY: CPT | Mod: RT

## 2023-02-24 PROCEDURE — ZZZZZ: CPT

## 2023-03-22 ENCOUNTER — APPOINTMENT (OUTPATIENT)
Dept: HEART AND VASCULAR | Facility: CLINIC | Age: 78
End: 2023-03-22
Payer: MEDICARE

## 2023-03-22 VITALS
BODY MASS INDEX: 20.83 KG/M2 | DIASTOLIC BLOOD PRESSURE: 74 MMHG | HEIGHT: 65 IN | HEART RATE: 87 BPM | WEIGHT: 125 LBS | TEMPERATURE: 98.6 F | SYSTOLIC BLOOD PRESSURE: 118 MMHG

## 2023-03-22 PROCEDURE — 99213 OFFICE O/P EST LOW 20 MIN: CPT | Mod: 57

## 2023-04-07 ENCOUNTER — APPOINTMENT (OUTPATIENT)
Dept: HEART AND VASCULAR | Facility: CLINIC | Age: 78
End: 2023-04-07
Payer: MEDICARE

## 2023-04-07 VITALS
HEIGHT: 65 IN | SYSTOLIC BLOOD PRESSURE: 98 MMHG | WEIGHT: 123 LBS | DIASTOLIC BLOOD PRESSURE: 60 MMHG | TEMPERATURE: 98.6 F | BODY MASS INDEX: 20.49 KG/M2 | HEART RATE: 88 BPM | OXYGEN SATURATION: 96 %

## 2023-04-07 DIAGNOSIS — R25.2 CRAMP AND SPASM: ICD-10-CM

## 2023-04-07 PROCEDURE — 37766 PHLEB VEINS - EXTREM 20+: CPT | Mod: RT

## 2023-04-21 ENCOUNTER — APPOINTMENT (OUTPATIENT)
Dept: HEART AND VASCULAR | Facility: CLINIC | Age: 78
End: 2023-04-21
Payer: MEDICARE

## 2023-04-21 VITALS
BODY MASS INDEX: 20.83 KG/M2 | HEART RATE: 81 BPM | HEIGHT: 65 IN | SYSTOLIC BLOOD PRESSURE: 106 MMHG | DIASTOLIC BLOOD PRESSURE: 69 MMHG | OXYGEN SATURATION: 97 % | WEIGHT: 125 LBS

## 2023-04-21 DIAGNOSIS — M79.604 PAIN IN RIGHT LEG: ICD-10-CM

## 2023-04-21 PROCEDURE — 99212 OFFICE O/P EST SF 10 MIN: CPT

## 2023-06-28 ENCOUNTER — APPOINTMENT (OUTPATIENT)
Dept: MAMMOGRAPHY | Facility: CLINIC | Age: 78
End: 2023-06-28

## 2023-07-19 ENCOUNTER — APPOINTMENT (OUTPATIENT)
Dept: HEART AND VASCULAR | Facility: CLINIC | Age: 78
End: 2023-07-19

## 2023-07-31 ENCOUNTER — RX RENEWAL (OUTPATIENT)
Age: 78
End: 2023-07-31

## 2023-08-18 ENCOUNTER — OUTPATIENT (OUTPATIENT)
Dept: OUTPATIENT SERVICES | Facility: HOSPITAL | Age: 78
LOS: 1 days | End: 2023-08-18
Payer: MEDICARE

## 2023-08-18 ENCOUNTER — APPOINTMENT (OUTPATIENT)
Dept: ULTRASOUND IMAGING | Facility: HOSPITAL | Age: 78
End: 2023-08-18
Payer: MEDICARE

## 2023-08-18 DIAGNOSIS — Z90.49 ACQUIRED ABSENCE OF OTHER SPECIFIED PARTS OF DIGESTIVE TRACT: Chronic | ICD-10-CM

## 2023-08-18 DIAGNOSIS — Z90.721 ACQUIRED ABSENCE OF OVARIES, UNILATERAL: Chronic | ICD-10-CM

## 2023-08-18 DIAGNOSIS — Z98.890 OTHER SPECIFIED POSTPROCEDURAL STATES: Chronic | ICD-10-CM

## 2023-08-18 PROCEDURE — 76830 TRANSVAGINAL US NON-OB: CPT

## 2023-08-18 PROCEDURE — 76830 TRANSVAGINAL US NON-OB: CPT | Mod: 26

## 2023-08-18 PROCEDURE — 76856 US EXAM PELVIC COMPLETE: CPT

## 2023-08-18 PROCEDURE — 76856 US EXAM PELVIC COMPLETE: CPT | Mod: 26

## 2023-09-01 ENCOUNTER — RX RENEWAL (OUTPATIENT)
Age: 78
End: 2023-09-01

## 2023-09-21 ENCOUNTER — APPOINTMENT (OUTPATIENT)
Dept: HEART AND VASCULAR | Facility: CLINIC | Age: 78
End: 2023-09-21
Payer: MEDICARE

## 2023-09-21 VITALS
DIASTOLIC BLOOD PRESSURE: 73 MMHG | SYSTOLIC BLOOD PRESSURE: 116 MMHG | HEIGHT: 65 IN | HEART RATE: 71 BPM | BODY MASS INDEX: 21.16 KG/M2 | WEIGHT: 127 LBS | OXYGEN SATURATION: 95 %

## 2023-09-21 DIAGNOSIS — L98.9 DISORDER OF THE SKIN AND SUBCUTANEOUS TISSUE, UNSPECIFIED: ICD-10-CM

## 2023-09-21 DIAGNOSIS — Z02.89 ENCOUNTER FOR OTHER ADMINISTRATIVE EXAMINATIONS: ICD-10-CM

## 2023-09-21 PROCEDURE — 99214 OFFICE O/P EST MOD 30 MIN: CPT | Mod: 25

## 2023-09-21 PROCEDURE — 93000 ELECTROCARDIOGRAM COMPLETE: CPT

## 2023-09-27 ENCOUNTER — APPOINTMENT (OUTPATIENT)
Dept: UROLOGY | Facility: CLINIC | Age: 78
End: 2023-09-27
Payer: MEDICARE

## 2023-09-27 VITALS — SYSTOLIC BLOOD PRESSURE: 114 MMHG | DIASTOLIC BLOOD PRESSURE: 68 MMHG | OXYGEN SATURATION: 96 % | HEART RATE: 82 BPM

## 2023-09-27 DIAGNOSIS — N32.81 OVERACTIVE BLADDER: ICD-10-CM

## 2023-09-27 DIAGNOSIS — R35.0 FREQUENCY OF MICTURITION: ICD-10-CM

## 2023-09-27 DIAGNOSIS — Z78.9 OTHER SPECIFIED HEALTH STATUS: ICD-10-CM

## 2023-09-27 LAB
BILIRUB UR QL STRIP: NORMAL
CLARITY UR: CLEAR
COLLECTION METHOD: NORMAL
GLUCOSE UR-MCNC: NORMAL
HCG UR QL: 0.2 EU/DL
HGB UR QL STRIP.AUTO: NORMAL
KETONES UR-MCNC: NORMAL
LEUKOCYTE ESTERASE UR QL STRIP: NORMAL
NITRITE UR QL STRIP: NORMAL
PH UR STRIP: 5.5
PROT UR STRIP-MCNC: NORMAL
SP GR UR STRIP: 1.02

## 2023-09-27 PROCEDURE — 99204 OFFICE O/P NEW MOD 45 MIN: CPT

## 2023-09-28 LAB
APPEARANCE: CLEAR
BACTERIA: NEGATIVE /HPF
BILIRUBIN URINE: NEGATIVE
BLOOD URINE: ABNORMAL
CAST: 1 /LPF
COLOR: YELLOW
EPITHELIAL CELLS: 0 /HPF
GLUCOSE QUALITATIVE U: NEGATIVE MG/DL
KETONES URINE: NEGATIVE MG/DL
LEUKOCYTE ESTERASE URINE: NEGATIVE
MICROSCOPIC-UA: NORMAL
NITRITE URINE: NEGATIVE
PH URINE: 5.5
PROTEIN URINE: NEGATIVE MG/DL
RED BLOOD CELLS URINE: 1 /HPF
REVIEW: NORMAL
SPECIFIC GRAVITY URINE: 1.02
UROBILINOGEN URINE: 0.2 MG/DL
WHITE BLOOD CELLS URINE: 1 /HPF

## 2023-09-29 LAB — BACTERIA UR CULT: NORMAL

## 2023-11-08 ENCOUNTER — APPOINTMENT (OUTPATIENT)
Dept: UROLOGY | Facility: CLINIC | Age: 78
End: 2023-11-08
Payer: MEDICARE

## 2023-11-08 VITALS — HEART RATE: 65 BPM | SYSTOLIC BLOOD PRESSURE: 122 MMHG | TEMPERATURE: 98 F | DIASTOLIC BLOOD PRESSURE: 80 MMHG

## 2023-11-08 LAB
BILIRUB UR QL STRIP: NORMAL
CLARITY UR: CLEAR
COLLECTION METHOD: NORMAL
GLUCOSE UR-MCNC: NORMAL
HCG UR QL: 0.2 EU/DL
HGB UR QL STRIP.AUTO: NORMAL
KETONES UR-MCNC: NORMAL
LEUKOCYTE ESTERASE UR QL STRIP: NORMAL
NITRITE UR QL STRIP: NORMAL
PH UR STRIP: 5
PROT UR STRIP-MCNC: NORMAL
SP GR UR STRIP: 1.02

## 2023-11-08 PROCEDURE — 99213 OFFICE O/P EST LOW 20 MIN: CPT

## 2023-11-16 NOTE — ED ADULT NURSE NOTE - SUICIDE SCREENING QUESTION 3
----- Message from VANDANA Amin-CNP sent at 11/16/2023  8:05 AM EST -----  Emerging has suspicious lesions that may need biopsy, if she is agreeable we will consult Dr. Atkins   No

## 2024-01-02 ENCOUNTER — RX RENEWAL (OUTPATIENT)
Age: 79
End: 2024-01-02

## 2024-03-21 ENCOUNTER — APPOINTMENT (OUTPATIENT)
Dept: HEART AND VASCULAR | Facility: CLINIC | Age: 79
End: 2024-03-21
Payer: MEDICARE

## 2024-03-21 VITALS
SYSTOLIC BLOOD PRESSURE: 100 MMHG | OXYGEN SATURATION: 96 % | HEART RATE: 80 BPM | HEIGHT: 65 IN | TEMPERATURE: 98 F | BODY MASS INDEX: 21.66 KG/M2 | DIASTOLIC BLOOD PRESSURE: 66 MMHG | WEIGHT: 130 LBS

## 2024-03-21 DIAGNOSIS — R06.00 DYSPNEA, UNSPECIFIED: ICD-10-CM

## 2024-03-21 PROCEDURE — 93000 ELECTROCARDIOGRAM COMPLETE: CPT

## 2024-03-21 PROCEDURE — 99214 OFFICE O/P EST MOD 30 MIN: CPT | Mod: 25

## 2024-03-21 PROCEDURE — 93306 TTE W/DOPPLER COMPLETE: CPT

## 2024-03-21 RX ORDER — HYDROCHLOROTHIAZIDE 25 MG/1
25 TABLET ORAL
Qty: 90 | Refills: 1 | Status: ACTIVE | COMMUNITY
Start: 2021-09-09

## 2024-03-24 NOTE — REVIEW OF SYSTEMS
[Dyspnea on exertion] : dyspnea during exertion [Lower Ext Edema] : lower extremity edema [Chills] : no chills [Fever] : no fever [Chest Discomfort] : no chest discomfort [Palpitations] : no palpitations [PND] : no PND [Orthopnea] : no orthopnea [Cough] : no cough [Nausea] : no nausea [Blood in Stool] : no blood in stool [Diarrhea] : diarrhea [Vomiting] : no vomiting [Dizziness] : no dizziness [Numbness (Hypoesthesia)] : no numbness [Weakness] : no weakness [Speech Disturbance] : no speech disturbance [Easy Bleeding] : no tendency for easy bleeding

## 2024-03-24 NOTE — DISCUSSION/SUMMARY
[EKG obtained to assist in diagnosis and management of assessed problem(s)] : EKG obtained to assist in diagnosis and management of assessed problem(s) [FreeTextEntry1] : 78 year old female with PMHX of SVT and varicose veins here for follow up and echocardiogram.  FIGUEROA: New in the past few months with no chest pains. EKG SR with R' in V1-V2 with minimal changes. Echocardiogram today was unremarkable. BNP sent to evaluate for  HF and H/H sent to check for anemia. She will return for a stress echocardiogram to evaluate for CAD. Continue with risk factor optimization.  HLD: LDL goal < 100. Non fasting labs sent today  SVT: Asymptomatic.   Return for a stress echocardiogram.  I, Dr. Patricia Nevarez personally performed the evaluation and management services for this patient who presents today with a new problem.  The evaluation and management includes conducting the examination assessing all conditions and establishing a new plan of care.  Today my ACP Alissa Tariq was here and recorded the evaluation and management services.   The echocardiogram shows an ejection fraction of 60% without significant valvular disease.  Patient's exercise tolerance is fairly good but she notices a change.  She is at risk for ischemia.  Will check stress echocardiogram.  Laboratory studies were reviewed.  Her LDL is good.  She will continue cholestyramine.

## 2024-03-24 NOTE — HISTORY OF PRESENT ILLNESS
[FreeTextEntry1] : 78 year old female with PMHX of SVT and varicose veins here for follow up and echocardiogram.  She has noticed an increase in dyspnea on exertion for the past few months. She feels she gets mildly out of breath at 3 flights of stairs when she didn't have any complications previously. She has no associated chest pains. She feels this is more from deconditioning because she has slightly decreased her activity level noted an increase at about 4lbs. She has notices some edema at the end of the day but this is her baseline. She has no orthopnea or PND.   Patient denies any palpitations, shortness of breath at rest, dizziness, falls, syncope or neuro focal deficits.  She did heat a sausage, egg and cheese right before her visit.

## 2024-03-25 LAB
ANION GAP SERPL CALC-SCNC: 13 MMOL/L
BUN SERPL-MCNC: 17 MG/DL
CALCIUM SERPL-MCNC: 9.3 MG/DL
CHLORIDE SERPL-SCNC: 102 MMOL/L
CHOLEST SERPL-MCNC: 227 MG/DL
CO2 SERPL-SCNC: 25 MMOL/L
CREAT SERPL-MCNC: 0.83 MG/DL
EGFR: 72 ML/MIN/1.73M2
GLUCOSE SERPL-MCNC: 103 MG/DL
HCT VFR BLD CALC: 40.3 %
HDLC SERPL-MCNC: 96 MG/DL
HGB BLD-MCNC: 13 G/DL
LDLC SERPL CALC-MCNC: 97 MG/DL
NONHDLC SERPL-MCNC: 131 MG/DL
NT-PROBNP SERPL-MCNC: 39 PG/ML
POTASSIUM SERPL-SCNC: 4 MMOL/L
SODIUM SERPL-SCNC: 140 MMOL/L
TRIGL SERPL-MCNC: 207 MG/DL

## 2024-05-03 ENCOUNTER — EMERGENCY (EMERGENCY)
Facility: HOSPITAL | Age: 79
LOS: 1 days | Discharge: HOME | End: 2024-05-03
Attending: EMERGENCY MEDICINE | Admitting: EMERGENCY MEDICINE
Payer: MEDICARE

## 2024-05-03 VITALS
RESPIRATION RATE: 18 BRPM | SYSTOLIC BLOOD PRESSURE: 115 MMHG | DIASTOLIC BLOOD PRESSURE: 76 MMHG | OXYGEN SATURATION: 99 % | HEART RATE: 74 BPM

## 2024-05-03 VITALS
RESPIRATION RATE: 18 BRPM | DIASTOLIC BLOOD PRESSURE: 75 MMHG | SYSTOLIC BLOOD PRESSURE: 118 MMHG | TEMPERATURE: 98 F | HEART RATE: 68 BPM | OXYGEN SATURATION: 96 % | WEIGHT: 130.07 LBS

## 2024-05-03 DIAGNOSIS — M25.561 PAIN IN RIGHT KNEE: ICD-10-CM

## 2024-05-03 DIAGNOSIS — Z98.890 OTHER SPECIFIED POSTPROCEDURAL STATES: Chronic | ICD-10-CM

## 2024-05-03 DIAGNOSIS — Z90.721 ACQUIRED ABSENCE OF OVARIES, UNILATERAL: Chronic | ICD-10-CM

## 2024-05-03 DIAGNOSIS — Z90.49 ACQUIRED ABSENCE OF OTHER SPECIFIED PARTS OF DIGESTIVE TRACT: Chronic | ICD-10-CM

## 2024-05-03 DIAGNOSIS — I87.8 OTHER SPECIFIED DISORDERS OF VEINS: ICD-10-CM

## 2024-05-03 DIAGNOSIS — Z88.8 ALLERGY STATUS TO OTHER DRUGS, MEDICAMENTS AND BIOLOGICAL SUBSTANCES: ICD-10-CM

## 2024-05-03 DIAGNOSIS — M79.604 PAIN IN RIGHT LEG: ICD-10-CM

## 2024-05-03 DIAGNOSIS — Z88.1 ALLERGY STATUS TO OTHER ANTIBIOTIC AGENTS STATUS: ICD-10-CM

## 2024-05-03 PROCEDURE — 93971 EXTREMITY STUDY: CPT | Mod: 26,RT

## 2024-05-03 PROCEDURE — 73562 X-RAY EXAM OF KNEE 3: CPT

## 2024-05-03 PROCEDURE — 99284 EMERGENCY DEPT VISIT MOD MDM: CPT | Mod: 25

## 2024-05-03 PROCEDURE — 73562 X-RAY EXAM OF KNEE 3: CPT | Mod: 26,RT

## 2024-05-03 PROCEDURE — 93971 EXTREMITY STUDY: CPT

## 2024-05-03 PROCEDURE — 99284 EMERGENCY DEPT VISIT MOD MDM: CPT | Mod: FS

## 2024-05-03 RX ORDER — IBUPROFEN 200 MG
600 TABLET ORAL ONCE
Refills: 0 | Status: COMPLETED | OUTPATIENT
Start: 2024-05-03 | End: 2024-05-03

## 2024-05-03 RX ADMIN — Medication 600 MILLIGRAM(S): at 19:49

## 2024-05-03 NOTE — ED PROVIDER NOTE - NS ED ATTENDING STATEMENT MOD
I have seen and examined this patient and fully participated in the care of this patient as the teaching attending.  The service was shared with the RAFAEL.  I reviewed and verified the documentation.

## 2024-05-03 NOTE — ED PROVIDER NOTE - CLINICAL SUMMARY MEDICAL DECISION MAKING FREE TEXT BOX
pt c/o R knee pain x few d, now getting worse, no blunt trauma/fall, but unsure if twisted it a few d ago, ambulatory w/limp, neurovascular intact, soft compartments and well perfused, ? baker's cyst vs dvt vs nikko vs arthritis, xray to r/o fx, doppler to r/o dvt. pt given ibuprofen, signed out to dr ny pending xray/doppler and dispo accordingly

## 2024-05-03 NOTE — ED PROVIDER NOTE - PATIENT PORTAL LINK FT
You can access the FollowMyHealth Patient Portal offered by [facility] by registering at the following website: [email or web address]. By joining [employer]’s FollowMyHealth portal, you will also be able to view your health information using other applications (apps) compatible with our system.

## 2024-05-03 NOTE — ED PROVIDER NOTE - OBJECTIVE STATEMENT
The pt is a 77 y/o F, who presents to ED c/o R leg pain x few d. Pt states that "may have twisted it but wasn't significant", has been having increased pain and swelling over past 2 d, took tyl and ibuprofen w/min relief. Pain to back of R knee, constant and dull, aggravated w/moving and walking, 7/10, radiates down and up the leg. Denies blunt trauma, fall, numbness or tingling to toes, redness, fevers, chills, cp, sob.

## 2024-05-03 NOTE — ED ADULT NURSE NOTE - OBJECTIVE STATEMENT
77 yo female c/o R leg pain, worsening since Monday. Denies redness, swelling, injury, numbness/tingling. Reports increased difficulty walking and bending R knee. Reports she takes a water pill. AAOx4, NAD.

## 2024-05-03 NOTE — ED PROVIDER NOTE - ATTENDING CONTRIBUTION TO CARE
78 y o with R leg pain for the last few days, thinks may have twisted knee, now w pain to back of R knee, today unable to stand up and walk. Stated was running for the bus a few days which may have exacerbated symptoms. Active ROM of knee, no numbness, paresthesias, NVI, xr no fx/US  no DVT ?sprain, has ortho fu this Tuesday, ace wrapped in ED.

## 2024-05-03 NOTE — ED PROVIDER NOTE - NSFOLLOWUPINSTRUCTIONS_ED_ALL_ED_FT
RICE Therapy for Routine Care of Injuries  Many injuries can be cared for with rest, ice, compression, and elevation (RICE therapy). This includes:  Resting the injured body part.  Putting ice on the injury.  Putting pressure (compression) on the injury.  Raising the injured part (elevation).  Using RICE therapy can help to lessen pain and swelling.  Supplies needed:  Ice.  Plastic bag.  Towel.  Elastic bandage.  Pillow or pillows to raise your injured body part.  How to care for your injury with RICE therapy  Rest  Try to rest the injured part of your body. You can go back to your normal activities when your doctor says it is okay to do them and when you can do them without pain.  If you rest the injury too much, it may not heal as well. Some injuries heal better with early movement instead of resting for too long. Ask your doctor if you should do exercises to help your injury get better.  Ice  If told, put ice on the injured area. To do this:  Put ice in a plastic bag.  Place a towel between your skin and the bag.  Leave the ice on for 20 minutes, 2–3 times a day.  Take off the ice if your skin turns bright red. This is very important. If you cannot feel pain, heat, or cold, you have a greater risk of damage to the area.  Do not put ice on your bare skin. Use ice for as many days as your doctor tells you to use it.  Compression  Put pressure on the injured area. This can be done with an elastic bandage. If this type of bandage has been put on your injury:  Follow instructions on the package the bandage came in about how to use it.  Do not wrap the bandage too tightly.  Wrap the bandage more loosely if part of your body beyond the bandage is blue, swollen, cold, painful, or loses feeling.  Take off the bandage and put it on again every 3–4 hours or as told by your doctor.  See your doctor if the bandage seems to make your problems worse.  Elevation  Raise the injured area above the level of your heart while you are sitting or lying down.  Follow these instructions at home:  If your symptoms get worse or last a long time, make a follow-up appointment with your doctor. You may need to have imaging tests, such as X-rays or an MRI.  If you have imaging tests, ask how to get your results when they are ready.  Return to your normal activities when your doctor says that it is safe.  Keep all follow-up visits.  Contact a doctor if:  You keep having pain and swelling.  Your symptoms get worse.  Get help right away if:  You have sudden, very bad pain at your injury or lower than your injury.  You have redness or more swelling around your injury.  You have tingling or numbness at your injury or lower than your injury, and it does not go away when you take off the bandage.  Summary  Many injuries can be cared for using rest, ice, compression, and elevation (RICE therapy).  You can go back to your normal activities when your doctor says it is okay and when you can do them without pain.  Put ice on the injured area as told by your doctor.  Get help if your symptoms get worse or if you keep having pain and swelling. Please follow-up with your orthopedic doctor on Tuesday.  Continue to ace wrap the knee.  Return to the ED for any worsening or concerning symptoms.    RICE Therapy for Routine Care of Injuries  Many injuries can be cared for with rest, ice, compression, and elevation (RICE therapy). This includes:  Resting the injured body part.  Putting ice on the injury.  Putting pressure (compression) on the injury.  Raising the injured part (elevation).  Using RICE therapy can help to lessen pain and swelling.  Supplies needed:  Ice.  Plastic bag.  Towel.  Elastic bandage.  Pillow or pillows to raise your injured body part.  How to care for your injury with RICE therapy  Rest  Try to rest the injured part of your body. You can go back to your normal activities when your doctor says it is okay to do them and when you can do them without pain.  If you rest the injury too much, it may not heal as well. Some injuries heal better with early movement instead of resting for too long. Ask your doctor if you should do exercises to help your injury get better.  Ice  If told, put ice on the injured area. To do this:  Put ice in a plastic bag.  Place a towel between your skin and the bag.  Leave the ice on for 20 minutes, 2–3 times a day.  Take off the ice if your skin turns bright red. This is very important. If you cannot feel pain, heat, or cold, you have a greater risk of damage to the area.  Do not put ice on your bare skin. Use ice for as many days as your doctor tells you to use it.  Compression  Put pressure on the injured area. This can be done with an elastic bandage. If this type of bandage has been put on your injury:  Follow instructions on the package the bandage came in about how to use it.  Do not wrap the bandage too tightly.  Wrap the bandage more loosely if part of your body beyond the bandage is blue, swollen, cold, painful, or loses feeling.  Take off the bandage and put it on again every 3–4 hours or as told by your doctor.  See your doctor if the bandage seems to make your problems worse.  Elevation  Raise the injured area above the level of your heart while you are sitting or lying down.  Follow these instructions at home:  If your symptoms get worse or last a long time, make a follow-up appointment with your doctor. You may need to have imaging tests, such as X-rays or an MRI.  If you have imaging tests, ask how to get your results when they are ready.  Return to your normal activities when your doctor says that it is safe.  Keep all follow-up visits.  Contact a doctor if:  You keep having pain and swelling.  Your symptoms get worse.  Get help right away if:  You have sudden, very bad pain at your injury or lower than your injury.  You have redness or more swelling around your injury.  You have tingling or numbness at your injury or lower than your injury, and it does not go away when you take off the bandage.  Summary  Many injuries can be cared for using rest, ice, compression, and elevation (RICE therapy).  You can go back to your normal activities when your doctor says it is okay and when you can do them without pain.  Put ice on the injured area as told by your doctor.  Get help if your symptoms get worse or if you keep having pain and swelling.

## 2024-05-03 NOTE — ED PROVIDER NOTE - PROGRESS NOTE DETAILS
Mor: 78 y o with R leg pain for the last few days, thinks may have twisted knee, now w pain to R back of knee, today unable to stand up and walk.

## 2024-05-03 NOTE — ED PROVIDER NOTE - MUSCULOSKELETAL, MLM
Spine appears normal, range of motion is not limited, no muscle or joint tenderness; R LE: + min swelling compared to L LE, + venous stasis changes b/l, + R popliteal tend, no bony tend over knee joint, no increased ligamentous laxity, no calf tend, no palpable cords, soft compartments, pedal pulses 2+ b/l, FROM, ambulatory w/limp

## 2024-06-04 ENCOUNTER — APPOINTMENT (OUTPATIENT)
Dept: HEART AND VASCULAR | Facility: CLINIC | Age: 79
End: 2024-06-04
Payer: MEDICARE

## 2024-06-04 ENCOUNTER — NON-APPOINTMENT (OUTPATIENT)
Age: 79
End: 2024-06-04

## 2024-06-04 VITALS
TEMPERATURE: 97.8 F | DIASTOLIC BLOOD PRESSURE: 73 MMHG | OXYGEN SATURATION: 96 % | HEART RATE: 75 BPM | WEIGHT: 134 LBS | HEIGHT: 65 IN | SYSTOLIC BLOOD PRESSURE: 117 MMHG | BODY MASS INDEX: 22.33 KG/M2

## 2024-06-04 DIAGNOSIS — I47.10 SUPRAVENTRICULAR TACHYCARDIA, UNSPECIFIED: ICD-10-CM

## 2024-06-04 PROCEDURE — 99214 OFFICE O/P EST MOD 30 MIN: CPT | Mod: 25

## 2024-06-04 PROCEDURE — 93000 ELECTROCARDIOGRAM COMPLETE: CPT

## 2024-06-04 NOTE — HISTORY OF PRESENT ILLNESS
[FreeTextEntry1] : When I the patient had 3 caffeinated drinks and usually has 1.  2 AM she felt funny beat which was either fast or skips or strong.  It lasted for 12 hours and slowly improved in the morning.  Since then she feels missed beats which last for 2 minutes.  She walks one quarter to half a mile without difficulty.  She has dyspnea on 3 flights of stairs.

## 2024-06-04 NOTE — PHYSICAL EXAM
[Well Developed] : well developed [Well Nourished] : well nourished [No Acute Distress] : no acute distress [Normal S1, S2] : normal S1, S2 [No Murmur] : no murmur [Clear Lung Fields] : clear lung fields [Good Air Entry] : good air entry [No Respiratory Distress] : no respiratory distress  [Normal] : soft, non-tender, no masses/organomegaly, normal bowel sounds [Normal Gait] : normal gait [Edema ___] : edema [unfilled] [Venous varicosities] : venous varicosities [Moves all extremities] : moves all extremities [No Focal Deficits] : no focal deficits [Normal Speech] : normal speech [Alert and Oriented] : alert and oriented [Normal memory] : normal memory [de-identified] : non pitting edema [de-identified] : Right DP 2+

## 2024-06-04 NOTE — REVIEW OF SYSTEMS
[Dyspnea on exertion] : dyspnea during exertion [Lower Ext Edema] : lower extremity edema [Palpitations] : palpitations [Confusion] : confusion was observed [Negative] : Heme/Lymph

## 2024-06-08 NOTE — PHYSICAL EXAM
RT Ventilator Management Note  Kirsty Em 71 y.o. female MRN: [id number]  Unit/Bed#: ICU 11 Encounter: [id number]      Daily Screen         6/8/2024 0824 6/8/2024 1929          Patient safety screen outcome:: Failed Failed (P)       Not Ready for Weaning due to:: Underline problem not resolved Underline problem not resolved (P)                 Physical Exam:   Assessment Type: (P) Assess only  General Appearance: (P) Sedated  Respiratory Pattern: (P) Assisted  Chest Assessment: (P) Chest expansion symmetrical  Bilateral Breath Sounds: (P) Diminished  O2 Device: (P) vent      Resp Comments: (P) Patient remains intubated on full mechanical support, no changes have been made to the patinet's vent settings. ETT remains at the pt's left held in place with a commercial tube odom.     06/08/24 1929   Respiratory Assessment   Assessment Type Assess only   General Appearance Sedated   Respiratory Pattern Assisted   Chest Assessment Chest expansion symmetrical   Bilateral Breath Sounds Diminished   Resp Comments Patient remains intubated on full mechanical support, no changes have been made to the patinet's vent settings. ETT remains at the pt's left held in place with a commercial tube odom.   O2 Device vent   Vent Information   Vent ID Buckner   Vent type Drager   Drager Vent Mode AC/VC+   $ Pulse Oximetry Spot Check Charge Completed   AC/VC+ Settings   Resp Rate (BPM) 18 BPM   VT (mL) 350 mL   Insp Time (S) 0.83 S   FIO2 (%) 40 %   PEEP (cmH2O) 6 cmH2O   Rise Time (%) 20 %   Trigger Sensitivity Flow (LPM) 2 LPM   Humidification Heater   Heater Temp 98.6 °F (37 °C)   AC/VC+ Actuals   Resp Rate (BPM) 18 BPM   VT (mL) 351 mL   MV (Obs) 5.66   MAP (cmH2O) 9.9 cmH2O   Peak Pressure (cmH2O) 23 cmH2O   I:E Ratio (Obs) 1:3   Static Compliance (mL/cmH20) 32.8 mL/cmH2O   Plateau Pressure (cm H2O) 16.3 cm H2O   Heater Temperature (Obs) 98.6 °F (37 °C)   AC/VC+ ALARMS   High Peak Pressure (cmH2O) 35 cmH2O   High Resp Rate (BPM)  35 BPM   High MV (L/min) 12 L/min   Low MV (L/min) 3.5 L/min   High VT (mL) 750 mL   Maintenance   Alarm (pink) cable attached Yes   Resuscitation bag with peep valve at bedside Yes   Water bag changed No   Circuit changed No   Daily Screen   Patient safety screen outcome: Failed   Not Ready for Weaning due to: Underline problem not resolved   IHI Ventilator Associated Pneumonia Bundle   Daily Assessment of Readiness to Extubate Yes   Head of Bed Elevated HOB 20   ETT  Oral 7.5 mm   Placement Date/Time: 06/07/24 1342   Previously placed by?: Attending  Preoxygenated: Yes  Type: Oral  Tube Size: 7.5 mm  Location: Oral  Insertion: Atraumatic  Placement Verification: Auscultation;End tidal CO2;Chest x-ray  Secured at (cm): 23  Place...   Secured at (cm) 21   Measured from Gums   Secured Location Left   Secured by Commercial tube odom   Site Condition Dry   Cuff Pressure (color) Green   HI-LO Suction  Continuous low suction   HI-LO Secretions Scant   HI-LO Intervention Patent        [Well Developed] : well developed [No Acute Distress] : no acute distress [Well Nourished] : well nourished [No Carotid Bruit] : no carotid bruit [Normal S1, S2] : normal S1, S2 [No Murmur] : no murmur [Clear Lung Fields] : clear lung fields [Good Air Entry] : good air entry [No Respiratory Distress] : no respiratory distress  [No Edema] : no edema [Normal Gait] : normal gait [Moves all extremities] : moves all extremities [No Focal Deficits] : no focal deficits [Alert and Oriented] : alert and oriented [Normal Speech] : normal speech [Normal memory] : normal memory

## 2024-06-13 ENCOUNTER — APPOINTMENT (OUTPATIENT)
Dept: HEART AND VASCULAR | Facility: CLINIC | Age: 79
End: 2024-06-13
Payer: MEDICARE

## 2024-06-13 VITALS
TEMPERATURE: 97.6 F | OXYGEN SATURATION: 96 % | HEIGHT: 65 IN | DIASTOLIC BLOOD PRESSURE: 72 MMHG | WEIGHT: 130 LBS | BODY MASS INDEX: 21.66 KG/M2 | SYSTOLIC BLOOD PRESSURE: 120 MMHG | HEART RATE: 71 BPM

## 2024-06-13 DIAGNOSIS — R60.0 LOCALIZED EDEMA: ICD-10-CM

## 2024-06-13 DIAGNOSIS — M17.10 UNILATERAL PRIMARY OSTEOARTHRITIS, UNSPECIFIED KNEE: ICD-10-CM

## 2024-06-13 DIAGNOSIS — I83.891 VARICOSE VEINS OF RIGHT LOWER EXTREMITY WITH OTHER COMPLICATIONS: ICD-10-CM

## 2024-06-13 DIAGNOSIS — I83.893 VARICOSE VEINS OF BILATERAL LOWER EXTREMITIES WITH OTHER COMPLICATIONS: ICD-10-CM

## 2024-06-13 PROCEDURE — 93970 EXTREMITY STUDY: CPT

## 2024-06-13 PROCEDURE — 99214 OFFICE O/P EST MOD 30 MIN: CPT | Mod: 25

## 2024-06-24 NOTE — PATIENT PROFILE ADULT - HAVE YOU EXPERIENCED VIOLENCE OR A TRAUMATIC EVENT?
2024    Mejia Garcia (:  1983) is a 40 y.o. male, here for a preventive medicine evaluation.    Subjective   Patient Active Problem List   Diagnosis    STEMI (ST elevation myocardial infarction) (HCC)    Fall down stairs       Review of Systems   Constitutional:  Positive for fatigue (chronic). Negative for appetite change, chills and fever.   HENT:  Negative for congestion, postnasal drip, rhinorrhea, sinus pressure and sore throat.    Respiratory:  Negative for apnea, cough, shortness of breath and wheezing.    Cardiovascular:  Negative for chest pain, palpitations and leg swelling.   Gastrointestinal:  Negative for abdominal pain, constipation, diarrhea, nausea and vomiting.   Genitourinary:  Negative for difficulty urinating, dysuria, frequency and urgency.   Musculoskeletal:  Positive for arthralgias. Negative for back pain and myalgias.   Skin:  Negative for color change and rash.   Neurological:  Negative for dizziness, light-headedness and headaches.   Psychiatric/Behavioral:  Positive for sleep disturbance. Negative for decreased concentration. The patient is not nervous/anxious.        Prior to Visit Medications    Medication Sig Taking? Authorizing Provider   atorvastatin (LIPITOR) 40 MG tablet Take 1 tablet by mouth nightly Yes Caro Hartmann MD   aspirin 81 MG chewable tablet Take 1 tablet by mouth daily Yes Caro Hartmann MD   clopidogrel (PLAVIX) 75 MG tablet Take 1 tablet by mouth daily Yes aCro Hartmann MD   furosemide (LASIX) 40 MG tablet Take 1 tablet by mouth daily Yes Caro Hartmann MD   losartan (COZAAR) 100 MG tablet Take 1 tablet by mouth daily Yes Caro Hartmann MD   hydroCHLOROthiazide (HYDRODIURIL) 25 MG tablet Take 1 tablet by mouth every morning Yes Caro Hartmann MD   potassium chloride (KLOR-CON M) 20 MEQ extended release tablet Take 1 tablet by mouth daily Yes Tenisha Koenig, APRN - CNP   moxifloxacin (VIGAMOX) 0.5 % ophthalmic solution INSTILL 1 DROP INTO  no

## 2024-07-07 ENCOUNTER — NON-APPOINTMENT (OUTPATIENT)
Age: 79
End: 2024-07-07

## 2024-07-08 ENCOUNTER — APPOINTMENT (OUTPATIENT)
Dept: HEART AND VASCULAR | Facility: CLINIC | Age: 79
End: 2024-07-08
Payer: MEDICARE

## 2024-07-08 VITALS
SYSTOLIC BLOOD PRESSURE: 116 MMHG | RESPIRATION RATE: 16 BRPM | HEART RATE: 74 BPM | BODY MASS INDEX: 21.99 KG/M2 | WEIGHT: 132 LBS | DIASTOLIC BLOOD PRESSURE: 70 MMHG | HEIGHT: 65 IN

## 2024-07-08 DIAGNOSIS — I47.10 SUPRAVENTRICULAR TACHYCARDIA, UNSPECIFIED: ICD-10-CM

## 2024-07-08 DIAGNOSIS — R06.00 DYSPNEA, UNSPECIFIED: ICD-10-CM

## 2024-07-08 DIAGNOSIS — Z78.9 OTHER SPECIFIED HEALTH STATUS: ICD-10-CM

## 2024-07-08 PROCEDURE — ZZZZZ: CPT | Mod: NC

## 2024-07-08 PROCEDURE — 93351 STRESS TTE COMPLETE: CPT

## 2024-07-08 PROCEDURE — 99214 OFFICE O/P EST MOD 30 MIN: CPT | Mod: 25

## 2024-07-08 RX ORDER — DILTIAZEM HYDROCHLORIDE 120 MG/1
120 CAPSULE, EXTENDED RELEASE ORAL
Qty: 30 | Refills: 6 | Status: ACTIVE | COMMUNITY
Start: 2024-07-08 | End: 1900-01-01

## 2024-07-08 RX ORDER — EFINACONAZOLE 100 MG/ML
10 SOLUTION TOPICAL
Qty: 4 | Refills: 0 | Status: ACTIVE | COMMUNITY
Start: 2024-06-20

## 2024-07-09 PROBLEM — Z78.9 ALCOHOL USE: Status: ACTIVE | Noted: 2024-07-09

## 2024-09-23 NOTE — ED ADULT TRIAGE NOTE - HEIGHT IN INCHES
Incoming Phone Call    Person calling: Mother  Preferred pharmacy: [facility] on [address] in [place]  Preferred method of communication (phone or portal): Phone-if scheduling in needed. Portal if drops are being sent  Reason for call (if drainage, specify ear): Right ear drainage     5

## 2024-12-25 PROBLEM — F10.90 ALCOHOL USE: Status: ACTIVE | Noted: 2024-07-09

## 2025-01-16 ENCOUNTER — APPOINTMENT (OUTPATIENT)
Dept: HEART AND VASCULAR | Facility: CLINIC | Age: 80
End: 2025-01-16
Payer: MEDICARE

## 2025-01-16 ENCOUNTER — TRANSCRIPTION ENCOUNTER (OUTPATIENT)
Age: 80
End: 2025-01-16

## 2025-01-16 ENCOUNTER — NON-APPOINTMENT (OUTPATIENT)
Age: 80
End: 2025-01-16

## 2025-01-16 VITALS
HEART RATE: 81 BPM | BODY MASS INDEX: 22.33 KG/M2 | SYSTOLIC BLOOD PRESSURE: 121 MMHG | DIASTOLIC BLOOD PRESSURE: 72 MMHG | WEIGHT: 134 LBS | OXYGEN SATURATION: 95 % | TEMPERATURE: 97.2 F | HEIGHT: 65 IN

## 2025-01-16 DIAGNOSIS — R60.0 LOCALIZED EDEMA: ICD-10-CM

## 2025-01-16 DIAGNOSIS — I47.10 SUPRAVENTRICULAR TACHYCARDIA, UNSPECIFIED: ICD-10-CM

## 2025-01-16 DIAGNOSIS — R00.2 PALPITATIONS: ICD-10-CM

## 2025-01-16 DIAGNOSIS — R06.00 DYSPNEA, UNSPECIFIED: ICD-10-CM

## 2025-01-16 DIAGNOSIS — Z13.6 ENCOUNTER FOR SCREENING FOR CARDIOVASCULAR DISORDERS: ICD-10-CM

## 2025-01-16 PROCEDURE — 36415 COLL VENOUS BLD VENIPUNCTURE: CPT

## 2025-01-16 PROCEDURE — 99214 OFFICE O/P EST MOD 30 MIN: CPT | Mod: 25

## 2025-01-16 PROCEDURE — 93000 ELECTROCARDIOGRAM COMPLETE: CPT

## 2025-01-17 DIAGNOSIS — E78.5 HYPERLIPIDEMIA, UNSPECIFIED: ICD-10-CM

## 2025-01-17 LAB
ALBUMIN SERPL ELPH-MCNC: 4.2 G/DL
ALP BLD-CCNC: 89 U/L
ALT SERPL-CCNC: 16 U/L
ANION GAP SERPL CALC-SCNC: 15 MMOL/L
AST SERPL-CCNC: 20 U/L
BILIRUB SERPL-MCNC: 0.6 MG/DL
BUN SERPL-MCNC: 15 MG/DL
CALCIUM SERPL-MCNC: 9.6 MG/DL
CHLORIDE SERPL-SCNC: 101 MMOL/L
CHOLEST SERPL-MCNC: 211 MG/DL
CO2 SERPL-SCNC: 24 MMOL/L
CREAT SERPL-MCNC: 0.79 MG/DL
EGFR: 76 ML/MIN/1.73M2
GLUCOSE SERPL-MCNC: 94 MG/DL
HCT VFR BLD CALC: 38.6 %
HDLC SERPL-MCNC: 89 MG/DL
HGB BLD-MCNC: 12.9 G/DL
LDLC SERPL CALC-MCNC: 102 MG/DL
NONHDLC SERPL-MCNC: 123 MG/DL
POTASSIUM SERPL-SCNC: 4 MMOL/L
PROT SERPL-MCNC: 6.7 G/DL
SODIUM SERPL-SCNC: 140 MMOL/L
TRIGL SERPL-MCNC: 125 MG/DL
TSH SERPL-ACNC: 3.53 UIU/ML

## 2025-01-17 RX ORDER — ROSUVASTATIN CALCIUM 5 MG/1
5 TABLET, FILM COATED ORAL
Qty: 90 | Refills: 1 | Status: ACTIVE | COMMUNITY
Start: 2025-01-17 | End: 1900-01-01

## 2025-01-22 NOTE — ED ADULT NURSE NOTE - NSSEPSISSUSPECTED_ED_A_ED
"Spiritual Care Visit  Spiritual Care Request    Reason for Visit:  Routine Visit: Introduction  Continue Visiting: Yes     Request Received From:  Referral From: Physician    Focus of Care:  Visited With: Patient and family together         Refer to :  Referral To:      Met with patient and her SO, Hai, to discuss her condition, potential discharge, systems of support and family. Patient's greatest concern is for her loved ones to make sure they are all okay as they are big mutual supports for each other. Patient views her hospitalization as \"it could be worse. I'm not as sick as other people here.\" Provided non-anxious, supportive presence, reflective listening and gentle inquiry into systems of support. Will continue to follow.  " No

## 2025-01-24 ENCOUNTER — RX RENEWAL (OUTPATIENT)
Age: 80
End: 2025-01-24

## 2025-01-27 ENCOUNTER — RX RENEWAL (OUTPATIENT)
Age: 80
End: 2025-01-27

## 2025-03-25 NOTE — ED ADULT NURSE NOTE - CAS TRG GENERAL AIRWAY, MLM
[FreeTextEntry1] : PHYSICAL EXAM : OBJECTIVE   GENERAL : Awake ,alert and oriented to time place and person  HEAD : normocephalic and atraumatic  NECK : supple ,no tracheal deviation ,no thyroid enlargement noted with swallowing EYES : sclera and conjunctiva normal no redness,intact extraocular movements  ENT  : ears and nose normal in appearance -hearing adequate  PULMONARY: effort normal. No respiratory distress. breathing regular. No wheezes  LYMPH : No swelling in limbs, capillary return within normal range  CVS : warm extremities,no palpitations,not short of breath, no visible jugular venous distention PSYCH : mood and affect normal ,good eye contact ,normal attention  ABDOMEN : no visible distension ,  NEUROLOGICAL:cranial nerves intact,muscle tone normal,gait and balance safe except where noted below  SKIN : warm and dry No rash detected over specific body areas examined  MUSCULOSKELETAL: normal muscle bulk, no focal bony tenderness /posture normal except where specified below c spine ROM full  some tension left traps  Patent

## 2025-04-16 ENCOUNTER — APPOINTMENT (OUTPATIENT)
Dept: HEART AND VASCULAR | Facility: CLINIC | Age: 80
End: 2025-04-16
Payer: MEDICARE

## 2025-04-16 VITALS
HEART RATE: 77 BPM | BODY MASS INDEX: 22.33 KG/M2 | HEIGHT: 65 IN | SYSTOLIC BLOOD PRESSURE: 120 MMHG | DIASTOLIC BLOOD PRESSURE: 72 MMHG | OXYGEN SATURATION: 95 % | TEMPERATURE: 97.2 F | WEIGHT: 134 LBS

## 2025-04-16 DIAGNOSIS — R00.2 PALPITATIONS: ICD-10-CM

## 2025-04-16 PROCEDURE — 99214 OFFICE O/P EST MOD 30 MIN: CPT

## 2025-04-16 PROCEDURE — 93000 ELECTROCARDIOGRAM COMPLETE: CPT

## 2025-04-17 ENCOUNTER — TRANSCRIPTION ENCOUNTER (OUTPATIENT)
Age: 80
End: 2025-04-17

## 2025-04-17 LAB
ALBUMIN SERPL ELPH-MCNC: 4.3 G/DL
ALP BLD-CCNC: 85 U/L
ALT SERPL-CCNC: 20 U/L
ANION GAP SERPL CALC-SCNC: 12 MMOL/L
AST SERPL-CCNC: 19 U/L
BILIRUB SERPL-MCNC: 0.5 MG/DL
BUN SERPL-MCNC: 18 MG/DL
CALCIUM SERPL-MCNC: 9.6 MG/DL
CHLORIDE SERPL-SCNC: 102 MMOL/L
CO2 SERPL-SCNC: 25 MMOL/L
CREAT SERPL-MCNC: 0.73 MG/DL
EGFRCR SERPLBLD CKD-EPI 2021: 83 ML/MIN/1.73M2
GLUCOSE SERPL-MCNC: 106 MG/DL
HCT VFR BLD CALC: 40.7 %
HGB BLD-MCNC: 13.3 G/DL
POTASSIUM SERPL-SCNC: 3.9 MMOL/L
PROT SERPL-MCNC: 6.8 G/DL
SODIUM SERPL-SCNC: 139 MMOL/L
TSH SERPL-ACNC: 3.7 UIU/ML

## 2025-05-13 ENCOUNTER — APPOINTMENT (OUTPATIENT)
Dept: HEART AND VASCULAR | Facility: CLINIC | Age: 80
End: 2025-05-13
Payer: MEDICARE

## 2025-05-13 ENCOUNTER — NON-APPOINTMENT (OUTPATIENT)
Age: 80
End: 2025-05-13

## 2025-05-13 VITALS
DIASTOLIC BLOOD PRESSURE: 65 MMHG | OXYGEN SATURATION: 95 % | HEART RATE: 76 BPM | WEIGHT: 129 LBS | SYSTOLIC BLOOD PRESSURE: 111 MMHG | HEIGHT: 65 IN | BODY MASS INDEX: 21.49 KG/M2

## 2025-05-13 DIAGNOSIS — Z02.89 ENCOUNTER FOR OTHER ADMINISTRATIVE EXAMINATIONS: ICD-10-CM

## 2025-05-13 DIAGNOSIS — R00.2 PALPITATIONS: ICD-10-CM

## 2025-05-13 DIAGNOSIS — I47.10 SUPRAVENTRICULAR TACHYCARDIA, UNSPECIFIED: ICD-10-CM

## 2025-05-13 PROCEDURE — 93000 ELECTROCARDIOGRAM COMPLETE: CPT

## 2025-05-13 PROCEDURE — 99213 OFFICE O/P EST LOW 20 MIN: CPT | Mod: 25

## 2025-05-13 PROCEDURE — 93306 TTE W/DOPPLER COMPLETE: CPT

## 2025-05-13 PROCEDURE — ZZZZZ: CPT | Mod: NC

## 2025-07-16 ENCOUNTER — RX RENEWAL (OUTPATIENT)
Age: 80
End: 2025-07-16

## 2025-08-21 ENCOUNTER — APPOINTMENT (OUTPATIENT)
Dept: HEART AND VASCULAR | Facility: CLINIC | Age: 80
End: 2025-08-21